# Patient Record
Sex: FEMALE | Race: WHITE | Employment: PART TIME | URBAN - METROPOLITAN AREA
[De-identification: names, ages, dates, MRNs, and addresses within clinical notes are randomized per-mention and may not be internally consistent; named-entity substitution may affect disease eponyms.]

---

## 2017-01-13 ENCOUNTER — LAB ENCOUNTER (OUTPATIENT)
Dept: LAB | Age: 47
End: 2017-01-13
Attending: PHYSICIAN ASSISTANT
Payer: COMMERCIAL

## 2017-01-13 ENCOUNTER — HOSPITAL ENCOUNTER (OUTPATIENT)
Dept: MAMMOGRAPHY | Age: 47
Discharge: HOME | End: 2017-01-13
Attending: PHYSICIAN ASSISTANT
Payer: COMMERCIAL

## 2017-01-13 DIAGNOSIS — Z00.00 LABORATORY EXAM ORDERED AS PART OF ROUTINE GENERAL MEDICAL EXAMINATION: ICD-10-CM

## 2017-01-13 DIAGNOSIS — Z12.31 VISIT FOR SCREENING MAMMOGRAM: ICD-10-CM

## 2017-01-13 LAB
25-HYDROXYVITAMIN D (TOTAL): 24.3 NG/ML (ref 30–100)
ALBUMIN SERPL-MCNC: 4.3 G/DL (ref 3.5–4.8)
ALP LIVER SERPL-CCNC: 95 U/L (ref 39–100)
ALT SERPL-CCNC: 18 U/L (ref 14–54)
AST SERPL-CCNC: 10 U/L (ref 15–41)
BASOPHILS # BLD AUTO: 0.01 X10(3) UL (ref 0–0.1)
BASOPHILS NFR BLD AUTO: 0.1 %
BILIRUB SERPL-MCNC: 0.6 MG/DL (ref 0.1–2)
BUN BLD-MCNC: 12 MG/DL (ref 8–20)
CALCIUM BLD-MCNC: 9.2 MG/DL (ref 8.3–10.3)
CHLORIDE: 105 MMOL/L (ref 101–111)
CHOLEST SMN-MCNC: 180 MG/DL (ref ?–200)
CO2: 28 MMOL/L (ref 22–32)
CREAT BLD-MCNC: 0.87 MG/DL (ref 0.55–1.02)
EOSINOPHIL # BLD AUTO: 0.17 X10(3) UL (ref 0–0.3)
EOSINOPHIL NFR BLD AUTO: 2.3 %
ERYTHROCYTE [DISTWIDTH] IN BLOOD BY AUTOMATED COUNT: 13 % (ref 11.5–16)
GLUCOSE BLD-MCNC: 85 MG/DL (ref 70–99)
HCT VFR BLD AUTO: 42.8 % (ref 34–50)
HDLC SERPL-MCNC: 47 MG/DL (ref 45–?)
HDLC SERPL: 3.83 {RATIO} (ref ?–4.44)
HGB BLD-MCNC: 13.8 G/DL (ref 12–16)
IMMATURE GRANULOCYTE COUNT: 0.03 X10(3) UL (ref 0–1)
IMMATURE GRANULOCYTE RATIO %: 0.4 %
LDLC SERPL CALC-MCNC: 116 MG/DL (ref ?–130)
LYMPHOCYTES # BLD AUTO: 1.55 X10(3) UL (ref 0.9–4)
LYMPHOCYTES NFR BLD AUTO: 20.7 %
M PROTEIN MFR SERPL ELPH: 7.7 G/DL (ref 6.1–8.3)
MCH RBC QN AUTO: 28.2 PG (ref 27–33.2)
MCHC RBC AUTO-ENTMCNC: 32.2 G/DL (ref 31–37)
MCV RBC AUTO: 87.3 FL (ref 81–100)
MONOCYTES # BLD AUTO: 0.49 X10(3) UL (ref 0.1–0.6)
MONOCYTES NFR BLD AUTO: 6.6 %
NEUTROPHIL ABS PRELIM: 5.23 X10 (3) UL (ref 1.3–6.7)
NEUTROPHILS # BLD AUTO: 5.23 X10(3) UL (ref 1.3–6.7)
NEUTROPHILS NFR BLD AUTO: 69.9 %
NONHDLC SERPL-MCNC: 133 MG/DL (ref ?–130)
PLATELET # BLD AUTO: 286 10(3)UL (ref 150–450)
POTASSIUM SERPL-SCNC: 4.6 MMOL/L (ref 3.6–5.1)
RBC # BLD AUTO: 4.9 X10(6)UL (ref 3.8–5.1)
RED CELL DISTRIBUTION WIDTH-SD: 40.7 FL (ref 35.1–46.3)
SODIUM SERPL-SCNC: 141 MMOL/L (ref 136–144)
TRIGLYCERIDES: 84 MG/DL (ref ?–150)
TSI SER-ACNC: 1.32 MIU/ML (ref 0.35–5.5)
VLDL: 17 MG/DL (ref 5–40)
WBC # BLD AUTO: 7.5 X10(3) UL (ref 4–13)

## 2017-01-13 PROCEDURE — 84443 ASSAY THYROID STIM HORMONE: CPT

## 2017-01-13 PROCEDURE — 77067 SCR MAMMO BI INCL CAD: CPT

## 2017-01-13 PROCEDURE — 36415 COLL VENOUS BLD VENIPUNCTURE: CPT

## 2017-01-13 PROCEDURE — 85025 COMPLETE CBC W/AUTO DIFF WBC: CPT

## 2017-01-13 PROCEDURE — 80053 COMPREHEN METABOLIC PANEL: CPT

## 2017-01-13 PROCEDURE — 77063 BREAST TOMOSYNTHESIS BI: CPT

## 2017-01-13 PROCEDURE — 82306 VITAMIN D 25 HYDROXY: CPT

## 2017-01-13 PROCEDURE — 80061 LIPID PANEL: CPT

## 2017-08-29 ENCOUNTER — OFFICE VISIT (OUTPATIENT)
Dept: FAMILY MEDICINE CLINIC | Facility: CLINIC | Age: 47
End: 2017-08-29

## 2017-08-29 VITALS
OXYGEN SATURATION: 97 % | WEIGHT: 161 LBS | SYSTOLIC BLOOD PRESSURE: 114 MMHG | HEIGHT: 64 IN | HEART RATE: 75 BPM | DIASTOLIC BLOOD PRESSURE: 80 MMHG | BODY MASS INDEX: 27.49 KG/M2 | RESPIRATION RATE: 20 BRPM | TEMPERATURE: 98 F

## 2017-08-29 DIAGNOSIS — M54.16 CHRONIC RADICULAR LUMBAR PAIN: Primary | ICD-10-CM

## 2017-08-29 DIAGNOSIS — G89.29 CHRONIC RADICULAR LUMBAR PAIN: Primary | ICD-10-CM

## 2017-08-29 PROCEDURE — 99213 OFFICE O/P EST LOW 20 MIN: CPT | Performed by: INTERNAL MEDICINE

## 2017-08-29 RX ORDER — COVID-19 ANTIGEN TEST
220 KIT MISCELLANEOUS
COMMUNITY
End: 2018-10-19 | Stop reason: ALTCHOICE

## 2017-08-29 NOTE — PROGRESS NOTES
HPI:   Sharon Lou is a 52year old female here for complaints of back pain. Pain is located at left low back, low back. Pain is described as dull, aching, sometimes sharp. Severity is moderate, constant.  The pain radiates to occasionally ankle with numbne

## 2017-08-31 ENCOUNTER — TELEPHONE (OUTPATIENT)
Dept: FAMILY MEDICINE CLINIC | Facility: CLINIC | Age: 47
End: 2017-08-31

## 2018-10-18 NOTE — PROGRESS NOTES
HPI:   Lakeshia Elliott is a 50year old female who presents for a complete physical exam. Symptoms: denies discharge, itching, burning or dysuria.  Patient complains of needing physical.     Wt Readings from Last 6 Encounters:  10/19/18 : 163 lb  08/29/17 : 1 History:   Procedure Laterality Date   • JUSTINA BIOPSY STEREOTACTIC NODULE 2 SITE BILAT  5/2011  2 sites     BENIGN      Family History   Problem Relation Age of Onset   • Heart Attack Father    • Other (grave's disease[other]) Mother    • Breast Cancer Mater axillary LAD, no masses no nipple discharge bilaterally  : external genitalia - no inguinal LAD, no lesions. Speculum exam- introitus is normal,scant discharge,cervix is pink.  Bimanual exam- no adnexal masses or tenderness, PAP was done   [facility] understanding of these issues and agrees to the plan. Follow up in 1 month for BP check.

## 2018-10-19 ENCOUNTER — OFFICE VISIT (OUTPATIENT)
Dept: FAMILY MEDICINE CLINIC | Facility: CLINIC | Age: 48
End: 2018-10-19
Payer: COMMERCIAL

## 2018-10-19 VITALS
SYSTOLIC BLOOD PRESSURE: 130 MMHG | DIASTOLIC BLOOD PRESSURE: 82 MMHG | HEART RATE: 88 BPM | HEIGHT: 63.5 IN | BODY MASS INDEX: 28.53 KG/M2 | WEIGHT: 163 LBS | TEMPERATURE: 99 F | RESPIRATION RATE: 16 BRPM | OXYGEN SATURATION: 98 %

## 2018-10-19 DIAGNOSIS — M79.89 SWELLING OF LOWER LEG: ICD-10-CM

## 2018-10-19 DIAGNOSIS — Z00.00 LABORATORY EXAM ORDERED AS PART OF ROUTINE GENERAL MEDICAL EXAMINATION: ICD-10-CM

## 2018-10-19 DIAGNOSIS — Z83.2 FAMILY HISTORY OF CLOTTING DISORDER: ICD-10-CM

## 2018-10-19 DIAGNOSIS — Z01.419 WELL WOMAN EXAM WITH ROUTINE GYNECOLOGICAL EXAM: Primary | ICD-10-CM

## 2018-10-19 DIAGNOSIS — Z98.890 HISTORY OF FEMALE STERILIZATION: ICD-10-CM

## 2018-10-19 DIAGNOSIS — Z12.31 VISIT FOR SCREENING MAMMOGRAM: ICD-10-CM

## 2018-10-19 DIAGNOSIS — Z12.4 SCREENING FOR MALIGNANT NEOPLASM OF CERVIX: ICD-10-CM

## 2018-10-19 DIAGNOSIS — R03.0 ELEVATED BLOOD PRESSURE READING: ICD-10-CM

## 2018-10-19 PROCEDURE — 88175 CYTOPATH C/V AUTO FLUID REDO: CPT | Performed by: PHYSICIAN ASSISTANT

## 2018-10-19 PROCEDURE — 87624 HPV HI-RISK TYP POOLED RSLT: CPT | Performed by: PHYSICIAN ASSISTANT

## 2018-10-19 PROCEDURE — 99396 PREV VISIT EST AGE 40-64: CPT | Performed by: PHYSICIAN ASSISTANT

## 2018-10-19 PROCEDURE — 99214 OFFICE O/P EST MOD 30 MIN: CPT | Performed by: PHYSICIAN ASSISTANT

## 2018-10-22 ENCOUNTER — LAB ENCOUNTER (OUTPATIENT)
Dept: LAB | Age: 48
End: 2018-10-22
Attending: PHYSICIAN ASSISTANT
Payer: COMMERCIAL

## 2018-10-22 DIAGNOSIS — Z83.2: ICD-10-CM

## 2018-10-22 DIAGNOSIS — M79.89 SWELLING OF LIMB: Primary | ICD-10-CM

## 2018-10-22 PROCEDURE — 83880 ASSAY OF NATRIURETIC PEPTIDE: CPT

## 2018-10-22 PROCEDURE — 81241 F5 GENE: CPT

## 2018-10-22 PROCEDURE — 80053 COMPREHEN METABOLIC PANEL: CPT

## 2018-10-22 PROCEDURE — 82306 VITAMIN D 25 HYDROXY: CPT

## 2018-10-22 PROCEDURE — 80061 LIPID PANEL: CPT

## 2018-10-22 PROCEDURE — 85025 COMPLETE CBC W/AUTO DIFF WBC: CPT

## 2018-10-22 PROCEDURE — 84443 ASSAY THYROID STIM HORMONE: CPT

## 2018-10-22 PROCEDURE — 82607 VITAMIN B-12: CPT

## 2018-10-24 ENCOUNTER — TELEPHONE (OUTPATIENT)
Dept: FAMILY MEDICINE CLINIC | Facility: CLINIC | Age: 48
End: 2018-10-24

## 2018-10-31 ENCOUNTER — HOSPITAL ENCOUNTER (OUTPATIENT)
Dept: MAMMOGRAPHY | Age: 48
Discharge: HOME | End: 2018-10-31
Attending: PHYSICIAN ASSISTANT
Payer: COMMERCIAL

## 2018-10-31 DIAGNOSIS — Z12.31 VISIT FOR SCREENING MAMMOGRAM: ICD-10-CM

## 2018-10-31 PROCEDURE — 77067 SCR MAMMO BI INCL CAD: CPT | Performed by: PHYSICIAN ASSISTANT

## 2018-10-31 PROCEDURE — 77063 BREAST TOMOSYNTHESIS BI: CPT | Performed by: PHYSICIAN ASSISTANT

## 2018-11-05 ENCOUNTER — HOSPITAL ENCOUNTER (OUTPATIENT)
Dept: ULTRASOUND IMAGING | Facility: HOSPITAL | Age: 48
Discharge: HOME | End: 2018-11-05
Attending: PHYSICIAN ASSISTANT
Payer: COMMERCIAL

## 2018-11-05 DIAGNOSIS — Z98.890 HISTORY OF FEMALE STERILIZATION: ICD-10-CM

## 2018-11-05 PROCEDURE — 76830 TRANSVAGINAL US NON-OB: CPT | Performed by: PHYSICIAN ASSISTANT

## 2018-11-05 PROCEDURE — 76856 US EXAM PELVIC COMPLETE: CPT | Performed by: PHYSICIAN ASSISTANT

## 2019-05-28 ENCOUNTER — PATIENT MESSAGE (OUTPATIENT)
Dept: FAMILY MEDICINE CLINIC | Facility: CLINIC | Age: 49
End: 2019-05-28

## 2019-05-29 RX ORDER — SCOLOPAMINE TRANSDERMAL SYSTEM 1 MG/1
1 PATCH, EXTENDED RELEASE TRANSDERMAL
Qty: 4 PATCH | Refills: 0 | Status: SHIPPED | OUTPATIENT
Start: 2019-05-29 | End: 2019-09-23 | Stop reason: ALTCHOICE

## 2019-05-29 NOTE — TELEPHONE ENCOUNTER
From: Ethel Mansfield  To: Yannick Magaña MD  Sent: 5/28/2019 7:16 PM CDT  Subject: Other    We are traveling to [facility] at the end of June and are planning on taking a day trip in a boat to [facility].  With Zackary's vestibular issues and my tenancy to be sick in

## 2019-06-18 ENCOUNTER — APPOINTMENT (OUTPATIENT)
Dept: LAB | Age: 49
End: 2019-06-18
Attending: INTERNAL MEDICINE
Payer: COMMERCIAL

## 2019-06-18 ENCOUNTER — OFFICE VISIT (OUTPATIENT)
Dept: FAMILY MEDICINE CLINIC | Facility: CLINIC | Age: 49
End: 2019-06-18
Payer: COMMERCIAL

## 2019-06-18 VITALS
HEART RATE: 73 BPM | BODY MASS INDEX: 29.22 KG/M2 | WEIGHT: 167 LBS | SYSTOLIC BLOOD PRESSURE: 124 MMHG | HEIGHT: 63.4 IN | OXYGEN SATURATION: 99 % | TEMPERATURE: 98 F | RESPIRATION RATE: 16 BRPM | DIASTOLIC BLOOD PRESSURE: 72 MMHG

## 2019-06-18 DIAGNOSIS — N91.2 AMENORRHEA: ICD-10-CM

## 2019-06-18 DIAGNOSIS — R23.2 HOT FLASHES: Primary | ICD-10-CM

## 2019-06-18 PROCEDURE — 84443 ASSAY THYROID STIM HORMONE: CPT | Performed by: INTERNAL MEDICINE

## 2019-06-18 PROCEDURE — 82670 ASSAY OF TOTAL ESTRADIOL: CPT | Performed by: INTERNAL MEDICINE

## 2019-06-18 PROCEDURE — 84146 ASSAY OF PROLACTIN: CPT | Performed by: INTERNAL MEDICINE

## 2019-06-18 PROCEDURE — 36415 COLL VENOUS BLD VENIPUNCTURE: CPT | Performed by: INTERNAL MEDICINE

## 2019-06-18 PROCEDURE — 99214 OFFICE O/P EST MOD 30 MIN: CPT | Performed by: INTERNAL MEDICINE

## 2019-06-18 RX ORDER — VENLAFAXINE HYDROCHLORIDE 37.5 MG/1
37.5 CAPSULE, EXTENDED RELEASE ORAL DAILY
Qty: 30 CAPSULE | Refills: 1 | Status: SHIPPED | OUTPATIENT
Start: 2019-06-18 | End: 2019-08-07

## 2019-06-18 NOTE — PROGRESS NOTES
Scout Butterfield is a 52year old female. HPI:   Here with menses that ended beginning of April. No may or June menses.   Also with + hot flashes, severe, about 2 an hour.  + night sweats  Some up & down mood but tolerable  Doesn't know FH regarding menopause ok to communicate thru MyChart regarding lab results and Rx. Orders Placed This Encounter      TSH [E]      Estradiol [E]      Prolactin [E]    None  No orders of the defined types were placed in this encounter.       The patient indicates understanding

## 2019-08-09 RX ORDER — VENLAFAXINE HYDROCHLORIDE 37.5 MG/1
37.5 CAPSULE, EXTENDED RELEASE ORAL DAILY
Qty: 30 CAPSULE | Refills: 1 | Status: SHIPPED | OUTPATIENT
Start: 2019-08-09 | End: 2019-09-23

## 2019-09-23 ENCOUNTER — OFFICE VISIT (OUTPATIENT)
Dept: FAMILY MEDICINE CLINIC | Facility: CLINIC | Age: 49
End: 2019-09-23
Payer: COMMERCIAL

## 2019-09-23 VITALS
RESPIRATION RATE: 20 BRPM | DIASTOLIC BLOOD PRESSURE: 76 MMHG | WEIGHT: 171 LBS | SYSTOLIC BLOOD PRESSURE: 122 MMHG | TEMPERATURE: 99 F | HEIGHT: 63.4 IN | HEART RATE: 87 BPM | BODY MASS INDEX: 29.92 KG/M2 | OXYGEN SATURATION: 98 %

## 2019-09-23 DIAGNOSIS — N95.1 HOT FLASHES, MENOPAUSAL: Primary | ICD-10-CM

## 2019-09-23 DIAGNOSIS — Z12.31 ENCOUNTER FOR SCREENING MAMMOGRAM FOR MALIGNANT NEOPLASM OF BREAST: ICD-10-CM

## 2019-09-23 PROCEDURE — 99213 OFFICE O/P EST LOW 20 MIN: CPT | Performed by: INTERNAL MEDICINE

## 2019-09-23 RX ORDER — VENLAFAXINE HYDROCHLORIDE 37.5 MG/1
37.5 CAPSULE, EXTENDED RELEASE ORAL DAILY
Qty: 90 CAPSULE | Refills: 3 | Status: SHIPPED | OUTPATIENT
Start: 2019-09-23 | End: 2020-12-21

## 2019-09-24 NOTE — PROGRESS NOTES
Lisa Sequeira is a 52year old female. HPI:   Here for follow up hot flashes that started when she stopped menses in April.  effexor xr started with relief of night sweats and flashes.   Pt has since had another menses in August.  Doing well on med, no SE encounter. [facility] HEMANT 2D+3D SCREENING BILAT (CPT=77067/33668)  Orders Placed This Encounter      Venlafaxine HCl ER (EFFEXOR XR) 37.5 MG Oral Capsule SR 24 Hr      The patient indicates understanding of these issues and agrees to the plan.   Follow up 1 ye

## 2019-10-15 ENCOUNTER — OFFICE VISIT (OUTPATIENT)
Dept: FAMILY MEDICINE CLINIC | Facility: CLINIC | Age: 49
End: 2019-10-15
Payer: COMMERCIAL

## 2019-10-15 VITALS
RESPIRATION RATE: 20 BRPM | HEART RATE: 74 BPM | TEMPERATURE: 99 F | HEIGHT: 64 IN | BODY MASS INDEX: 28.68 KG/M2 | DIASTOLIC BLOOD PRESSURE: 74 MMHG | SYSTOLIC BLOOD PRESSURE: 110 MMHG | WEIGHT: 168 LBS | OXYGEN SATURATION: 98 %

## 2019-10-15 DIAGNOSIS — J06.9 VIRAL UPPER RESPIRATORY TRACT INFECTION: Primary | ICD-10-CM

## 2019-10-15 DIAGNOSIS — J02.9 SORE THROAT: ICD-10-CM

## 2019-10-15 PROCEDURE — 99213 OFFICE O/P EST LOW 20 MIN: CPT | Performed by: INTERNAL MEDICINE

## 2019-10-15 PROCEDURE — 87880 STREP A ASSAY W/OPTIC: CPT | Performed by: INTERNAL MEDICINE

## 2019-10-16 NOTE — PROGRESS NOTES
HPI:   Yaquelin Bellamy is a 52year old female who presents with sore throat, congestion, dry cough for  4  days. Venlafaxine HCl ER (EFFEXOR XR) 37.5 MG Oral Capsule SR 24 Hr, Take 1 capsule (37.5 mg total) by mouth daily. , Disp: 90 capsule, Rfl: 3

## 2019-11-01 ENCOUNTER — OFFICE VISIT (OUTPATIENT)
Dept: FAMILY MEDICINE CLINIC | Facility: CLINIC | Age: 49
End: 2019-11-01
Payer: COMMERCIAL

## 2019-11-01 VITALS
HEIGHT: 64 IN | BODY MASS INDEX: 29.26 KG/M2 | RESPIRATION RATE: 16 BRPM | DIASTOLIC BLOOD PRESSURE: 82 MMHG | TEMPERATURE: 98 F | SYSTOLIC BLOOD PRESSURE: 122 MMHG | HEART RATE: 79 BPM | WEIGHT: 171.38 LBS

## 2019-11-01 DIAGNOSIS — R30.0 DYSURIA: Primary | ICD-10-CM

## 2019-11-01 PROCEDURE — 87086 URINE CULTURE/COLONY COUNT: CPT | Performed by: NURSE PRACTITIONER

## 2019-11-01 PROCEDURE — 87088 URINE BACTERIA CULTURE: CPT | Performed by: NURSE PRACTITIONER

## 2019-11-01 PROCEDURE — 87186 SC STD MICRODIL/AGAR DIL: CPT | Performed by: NURSE PRACTITIONER

## 2019-11-01 PROCEDURE — 99213 OFFICE O/P EST LOW 20 MIN: CPT | Performed by: NURSE PRACTITIONER

## 2019-11-01 PROCEDURE — 81003 URINALYSIS AUTO W/O SCOPE: CPT | Performed by: NURSE PRACTITIONER

## 2019-11-01 RX ORDER — NITROFURANTOIN 25; 75 MG/1; MG/1
100 CAPSULE ORAL 2 TIMES DAILY
Qty: 10 CAPSULE | Refills: 0 | COMMUNITY
Start: 2019-11-01 | End: 2020-12-21

## 2019-11-01 NOTE — PROGRESS NOTES
Juan Jose Christianson is a 52year old female. HPI:   Patient presents with urinary symptoms for about 1 week. Complaining of urinary frequency, urgency, dysuria, suprapubic pain. Denies flank pain, back pain, fever or hematuria.    Sexual activity: with spouse  V Negative mg/dL    Spec Gravity 1.005 1.005 - 1.030    Blood Urine trace-intact Negative    PH Urine 5.0 4.5 - 8.0    Protein Urine negative Negative/Trace mg/dL    Urobilinogen Urine 0.2 0.0 - 1.9 mg/dL    Nitrite Urine negative Negative    Leukocyte Mary Kate

## 2019-12-07 ENCOUNTER — OFFICE VISIT (OUTPATIENT)
Dept: FAMILY MEDICINE CLINIC | Facility: CLINIC | Age: 49
End: 2019-12-07
Payer: COMMERCIAL

## 2019-12-07 VITALS
SYSTOLIC BLOOD PRESSURE: 120 MMHG | BODY MASS INDEX: 33.12 KG/M2 | DIASTOLIC BLOOD PRESSURE: 80 MMHG | WEIGHT: 194 LBS | HEIGHT: 64 IN | RESPIRATION RATE: 16 BRPM | HEART RATE: 79 BPM | TEMPERATURE: 99 F | OXYGEN SATURATION: 98 %

## 2019-12-07 DIAGNOSIS — N30.01 ACUTE CYSTITIS WITH HEMATURIA: Primary | ICD-10-CM

## 2019-12-07 DIAGNOSIS — R30.0 DYSURIA: ICD-10-CM

## 2019-12-07 PROCEDURE — 87077 CULTURE AEROBIC IDENTIFY: CPT | Performed by: NURSE PRACTITIONER

## 2019-12-07 PROCEDURE — 81003 URINALYSIS AUTO W/O SCOPE: CPT | Performed by: NURSE PRACTITIONER

## 2019-12-07 PROCEDURE — 87186 SC STD MICRODIL/AGAR DIL: CPT | Performed by: NURSE PRACTITIONER

## 2019-12-07 PROCEDURE — 99213 OFFICE O/P EST LOW 20 MIN: CPT | Performed by: NURSE PRACTITIONER

## 2019-12-07 PROCEDURE — 87086 URINE CULTURE/COLONY COUNT: CPT | Performed by: NURSE PRACTITIONER

## 2019-12-07 RX ORDER — SULFAMETHOXAZOLE AND TRIMETHOPRIM 800; 160 MG/1; MG/1
1 TABLET ORAL 2 TIMES DAILY
Qty: 6 TABLET | Refills: 0 | Status: SHIPPED | OUTPATIENT
Start: 2019-12-07 | End: 2019-12-10

## 2019-12-07 NOTE — PROGRESS NOTES
Phu Clark is a 52year old female. HPI:   Patient presents with symptoms of UTI for 1 days. Complaining of urinary frequency, urgency, dysuria, pos suprapubic pressure,   Denies back pain, fever, hematuria.   Pt has history of UTI in past.    Radha Field Educated on medication  Educated on supportive measures  Educated on s/s of worsening sx and when to seek higher level of care  Follow up with pcp if not improving  Patient Instructions     Urinary Tract Infections in Women    Urinary tract infections (UTI Most UTIs are treated with antibiotics. These kill the bacteria. The length of time you need to take them depends on the type of infection. It may be as short as 3 days. If you have repeated UTIs, you may need a low-dose antibiotic for several months.  Take © 1289-4332 [employer] [address]. [address] [facility], [address] [place]. All rights reserved. This information is not intended as a substitute for professional medical care. Always follow your healthcare professional's instructions.           The p

## 2019-12-19 ENCOUNTER — HOSPITAL ENCOUNTER (OUTPATIENT)
Dept: MAMMOGRAPHY | Age: 49
Discharge: HOME | End: 2019-12-19
Attending: INTERNAL MEDICINE
Payer: COMMERCIAL

## 2019-12-19 DIAGNOSIS — Z12.31 ENCOUNTER FOR SCREENING MAMMOGRAM FOR MALIGNANT NEOPLASM OF BREAST: ICD-10-CM

## 2019-12-19 PROCEDURE — 77063 BREAST TOMOSYNTHESIS BI: CPT | Performed by: INTERNAL MEDICINE

## 2019-12-19 PROCEDURE — 77067 SCR MAMMO BI INCL CAD: CPT | Performed by: INTERNAL MEDICINE

## 2019-12-27 ENCOUNTER — HOSPITAL ENCOUNTER (OUTPATIENT)
Dept: ULTRASOUND IMAGING | Age: 49
Discharge: HOME | End: 2019-12-27
Attending: INTERNAL MEDICINE
Payer: COMMERCIAL

## 2019-12-27 ENCOUNTER — HOSPITAL ENCOUNTER (OUTPATIENT)
Dept: MAMMOGRAPHY | Age: 49
Discharge: HOME | End: 2019-12-27
Attending: INTERNAL MEDICINE
Payer: COMMERCIAL

## 2019-12-27 DIAGNOSIS — R92.2 INCONCLUSIVE MAMMOGRAM: ICD-10-CM

## 2019-12-27 PROCEDURE — 76642 ULTRASOUND BREAST LIMITED: CPT | Performed by: INTERNAL MEDICINE

## 2019-12-27 PROCEDURE — 77065 DX MAMMO INCL CAD UNI: CPT | Performed by: INTERNAL MEDICINE

## 2019-12-27 PROCEDURE — 77061 BREAST TOMOSYNTHESIS UNI: CPT | Performed by: INTERNAL MEDICINE

## 2020-03-12 ENCOUNTER — PATIENT MESSAGE (OUTPATIENT)
Dept: FAMILY MEDICINE CLINIC | Facility: CLINIC | Age: 50
End: 2020-03-12

## 2020-03-12 NOTE — TELEPHONE ENCOUNTER
From: Ethel Mansfield  To: Aneta Correa NP  Sent: 3/12/2020 2:13 PM CDT  Subject: Prescription Question    Shara Angel - Back in June you had prescribed effexor 37.5mg daily for hot flashes, it has worked great until recently I seem to have started get

## 2020-03-14 RX ORDER — VENLAFAXINE HYDROCHLORIDE 75 MG/1
75 CAPSULE, EXTENDED RELEASE ORAL DAILY
Qty: 90 CAPSULE | Refills: 1 | Status: SHIPPED | OUTPATIENT
Start: 2020-03-14 | End: 2020-09-08

## 2020-09-08 RX ORDER — VENLAFAXINE HYDROCHLORIDE 75 MG/1
CAPSULE, EXTENDED RELEASE ORAL
Qty: 90 CAPSULE | Refills: 0 | Status: SHIPPED | OUTPATIENT
Start: 2020-09-08 | End: 2020-12-17

## 2020-11-28 RX ORDER — VENLAFAXINE HYDROCHLORIDE 75 MG/1
CAPSULE, EXTENDED RELEASE ORAL
Qty: 90 CAPSULE | Refills: 0 | OUTPATIENT
Start: 2020-11-28

## 2020-12-03 ENCOUNTER — PATIENT MESSAGE (OUTPATIENT)
Dept: FAMILY MEDICINE CLINIC | Facility: CLINIC | Age: 50
End: 2020-12-03

## 2020-12-03 NOTE — TELEPHONE ENCOUNTER
From: Tatiana John  To: Susan Rebolledo NP  Sent: 12/3/2020 1:21 PM CST  Subject: Prescription Question    Hi,     I scheduled my annual check up, but could not get in until 12/21.  Can you refill my prescription for 30 days so I can have the medicin

## 2020-12-17 RX ORDER — VENLAFAXINE HYDROCHLORIDE 75 MG/1
75 CAPSULE, EXTENDED RELEASE ORAL DAILY
Qty: 90 CAPSULE | Refills: 0 | Status: SHIPPED | OUTPATIENT
Start: 2020-12-17 | End: 2021-03-05

## 2020-12-17 NOTE — TELEPHONE ENCOUNTER
Refill request:  9/8/2020 last 90 day refill of Venlafaxine HCL ER 75mg.   Last OV: 10/15/2019  Future OV 12/21/2020  Approve/deny:

## 2020-12-21 ENCOUNTER — OFFICE VISIT (OUTPATIENT)
Dept: FAMILY MEDICINE CLINIC | Facility: CLINIC | Age: 50
End: 2020-12-21
Payer: COMMERCIAL

## 2020-12-21 VITALS
HEIGHT: 64 IN | BODY MASS INDEX: 29.53 KG/M2 | HEART RATE: 71 BPM | DIASTOLIC BLOOD PRESSURE: 88 MMHG | TEMPERATURE: 98 F | SYSTOLIC BLOOD PRESSURE: 144 MMHG | WEIGHT: 173 LBS | RESPIRATION RATE: 20 BRPM | OXYGEN SATURATION: 98 %

## 2020-12-21 DIAGNOSIS — Z12.31 ENCOUNTER FOR SCREENING MAMMOGRAM FOR BREAST CANCER: ICD-10-CM

## 2020-12-21 DIAGNOSIS — Z00.00 ROUTINE GENERAL MEDICAL EXAMINATION AT A HEALTH CARE FACILITY: Primary | ICD-10-CM

## 2020-12-21 PROCEDURE — 99396 PREV VISIT EST AGE 40-64: CPT | Performed by: INTERNAL MEDICINE

## 2020-12-21 PROCEDURE — 3079F DIAST BP 80-89 MM HG: CPT | Performed by: INTERNAL MEDICINE

## 2020-12-21 PROCEDURE — 3077F SYST BP >= 140 MM HG: CPT | Performed by: INTERNAL MEDICINE

## 2020-12-21 PROCEDURE — 3008F BODY MASS INDEX DOCD: CPT | Performed by: INTERNAL MEDICINE

## 2020-12-21 NOTE — PROGRESS NOTES
HPI:   Lindsey Bishop is a 48year old female who presents for a well woman exam. Symptoms: denies discharge, itching, burning or dysuria, perimenopausal.  Went 4 months without a menses, then had one this month that was much lighter than usual.    Patient' MS: denies back pain  NEURO: denies headaches or dizziness  PSYCH: denies depression or anxiety  HEME: denies hx of anemia  ENDOCRINE: denies thyroid history, denies excessive thirst, denies significant weight change; + hot flashes/night sweats controlle Lipid Panel [E]      TSH W REFLEX TO FREE T4      .

## 2021-01-14 ENCOUNTER — LAB ENCOUNTER (OUTPATIENT)
Dept: LAB | Age: 51
End: 2021-01-14
Attending: INTERNAL MEDICINE
Payer: COMMERCIAL

## 2021-01-14 DIAGNOSIS — Z00.00 ROUTINE GENERAL MEDICAL EXAMINATION AT A HEALTH CARE FACILITY: ICD-10-CM

## 2021-01-14 LAB
ALBUMIN SERPL-MCNC: 3.6 G/DL (ref 3.4–5)
ALBUMIN/GLOB SERPL: 1 {RATIO} (ref 1–2)
ALP LIVER SERPL-CCNC: 111 U/L
ALT SERPL-CCNC: 22 U/L
ANION GAP SERPL CALC-SCNC: 5 MMOL/L (ref 0–18)
AST SERPL-CCNC: 11 U/L (ref 15–37)
BASOPHILS # BLD AUTO: 0.01 X10(3) UL (ref 0–0.2)
BASOPHILS NFR BLD AUTO: 0.1 %
BILIRUB SERPL-MCNC: 0.3 MG/DL (ref 0.1–2)
BUN BLD-MCNC: 12 MG/DL (ref 7–18)
BUN/CREAT SERPL: 12.8 (ref 10–20)
CALCIUM BLD-MCNC: 8.8 MG/DL (ref 8.5–10.1)
CHLORIDE SERPL-SCNC: 107 MMOL/L (ref 98–112)
CHOLEST SMN-MCNC: 199 MG/DL (ref ?–200)
CO2 SERPL-SCNC: 26 MMOL/L (ref 21–32)
CREAT BLD-MCNC: 0.94 MG/DL
DEPRECATED RDW RBC AUTO: 49.5 FL (ref 35.1–46.3)
EOSINOPHIL # BLD AUTO: 0 X10(3) UL (ref 0–0.7)
EOSINOPHIL NFR BLD AUTO: 0 %
ERYTHROCYTE [DISTWIDTH] IN BLOOD BY AUTOMATED COUNT: 16.5 % (ref 11–15)
GLOBULIN PLAS-MCNC: 3.5 G/DL (ref 2.8–4.4)
GLUCOSE BLD-MCNC: 106 MG/DL (ref 70–99)
HCT VFR BLD AUTO: 39.9 %
HDLC SERPL-MCNC: 49 MG/DL (ref 40–59)
HGB BLD-MCNC: 12 G/DL
IMM GRANULOCYTES # BLD AUTO: 0.02 X10(3) UL (ref 0–1)
IMM GRANULOCYTES NFR BLD: 0.3 %
LDLC SERPL CALC-MCNC: 127 MG/DL (ref ?–100)
LYMPHOCYTES # BLD AUTO: 1.61 X10(3) UL (ref 1–4)
LYMPHOCYTES NFR BLD AUTO: 22.8 %
M PROTEIN MFR SERPL ELPH: 7.1 G/DL (ref 6.4–8.2)
MCH RBC QN AUTO: 25.1 PG (ref 26–34)
MCHC RBC AUTO-ENTMCNC: 30.1 G/DL (ref 31–37)
MCV RBC AUTO: 83.3 FL
MONOCYTES # BLD AUTO: 0.52 X10(3) UL (ref 0.1–1)
MONOCYTES NFR BLD AUTO: 7.4 %
NEUTROPHILS # BLD AUTO: 4.89 X10 (3) UL (ref 1.5–7.7)
NEUTROPHILS # BLD AUTO: 4.89 X10(3) UL (ref 1.5–7.7)
NEUTROPHILS NFR BLD AUTO: 69.4 %
NONHDLC SERPL-MCNC: 150 MG/DL (ref ?–130)
OSMOLALITY SERPL CALC.SUM OF ELEC: 286 MOSM/KG (ref 275–295)
PATIENT FASTING Y/N/NP: YES
PATIENT FASTING Y/N/NP: YES
PLATELET # BLD AUTO: 277 10(3)UL (ref 150–450)
POTASSIUM SERPL-SCNC: 4.8 MMOL/L (ref 3.5–5.1)
RBC # BLD AUTO: 4.79 X10(6)UL
SODIUM SERPL-SCNC: 138 MMOL/L (ref 136–145)
TRIGL SERPL-MCNC: 113 MG/DL (ref 30–149)
TSI SER-ACNC: 1.49 MIU/ML (ref 0.36–3.74)
VLDLC SERPL CALC-MCNC: 23 MG/DL (ref 0–30)
WBC # BLD AUTO: 7.1 X10(3) UL (ref 4–11)

## 2021-01-14 PROCEDURE — 36415 COLL VENOUS BLD VENIPUNCTURE: CPT

## 2021-01-14 PROCEDURE — 80053 COMPREHEN METABOLIC PANEL: CPT

## 2021-01-14 PROCEDURE — 85025 COMPLETE CBC W/AUTO DIFF WBC: CPT

## 2021-01-14 PROCEDURE — 80061 LIPID PANEL: CPT

## 2021-01-14 PROCEDURE — 84443 ASSAY THYROID STIM HORMONE: CPT

## 2021-01-15 DIAGNOSIS — E78.00 HIGH CHOLESTEROL: ICD-10-CM

## 2021-01-15 DIAGNOSIS — R73.9 HYPERGLYCEMIA: Primary | ICD-10-CM

## 2021-01-27 ENCOUNTER — HOSPITAL ENCOUNTER (OUTPATIENT)
Dept: MAMMOGRAPHY | Age: 51
Discharge: HOME | End: 2021-01-27
Attending: INTERNAL MEDICINE
Payer: COMMERCIAL

## 2021-01-27 DIAGNOSIS — Z12.31 ENCOUNTER FOR SCREENING MAMMOGRAM FOR BREAST CANCER: ICD-10-CM

## 2021-01-27 DIAGNOSIS — Z00.00 ROUTINE GENERAL MEDICAL EXAMINATION AT A HEALTH CARE FACILITY: ICD-10-CM

## 2021-01-27 PROCEDURE — 77063 BREAST TOMOSYNTHESIS BI: CPT | Performed by: INTERNAL MEDICINE

## 2021-01-27 PROCEDURE — 77067 SCR MAMMO BI INCL CAD: CPT | Performed by: INTERNAL MEDICINE

## 2021-02-04 ENCOUNTER — HOSPITAL ENCOUNTER (OUTPATIENT)
Dept: ULTRASOUND IMAGING | Age: 51
Discharge: HOME | End: 2021-02-04
Attending: INTERNAL MEDICINE
Payer: COMMERCIAL

## 2021-02-04 DIAGNOSIS — R92.2 INCONCLUSIVE MAMMOGRAM: ICD-10-CM

## 2021-02-04 PROCEDURE — 76642 ULTRASOUND BREAST LIMITED: CPT | Performed by: INTERNAL MEDICINE

## 2021-03-05 RX ORDER — VENLAFAXINE HYDROCHLORIDE 75 MG/1
CAPSULE, EXTENDED RELEASE ORAL
Qty: 90 CAPSULE | Refills: 0 | Status: SHIPPED | OUTPATIENT
Start: 2021-03-05 | End: 2021-05-24

## 2021-04-13 ENCOUNTER — OFFICE VISIT (OUTPATIENT)
Dept: OBGYN CLINIC | Facility: CLINIC | Age: 51
End: 2021-04-13
Payer: COMMERCIAL

## 2021-04-13 VITALS
DIASTOLIC BLOOD PRESSURE: 70 MMHG | HEIGHT: 64 IN | HEART RATE: 73 BPM | BODY MASS INDEX: 29.53 KG/M2 | SYSTOLIC BLOOD PRESSURE: 126 MMHG | WEIGHT: 173 LBS

## 2021-04-13 DIAGNOSIS — Z87.42 HX OF MENORRHAGIA: ICD-10-CM

## 2021-04-13 DIAGNOSIS — Z01.812 PRE-PROCEDURAL LABORATORY EXAMINATION: ICD-10-CM

## 2021-04-13 DIAGNOSIS — Z12.4 PAPANICOLAOU SMEAR FOR CERVICAL CANCER SCREENING: Primary | ICD-10-CM

## 2021-04-13 DIAGNOSIS — Z30.430 ENCOUNTER FOR INSERTION OF INTRAUTERINE CONTRACEPTIVE DEVICE: ICD-10-CM

## 2021-04-13 PROCEDURE — 58300 INSERT INTRAUTERINE DEVICE: CPT | Performed by: OBSTETRICS & GYNECOLOGY

## 2021-04-13 PROCEDURE — 88175 CYTOPATH C/V AUTO FLUID REDO: CPT | Performed by: OBSTETRICS & GYNECOLOGY

## 2021-04-13 PROCEDURE — 3074F SYST BP LT 130 MM HG: CPT | Performed by: OBSTETRICS & GYNECOLOGY

## 2021-04-13 PROCEDURE — 3078F DIAST BP <80 MM HG: CPT | Performed by: OBSTETRICS & GYNECOLOGY

## 2021-04-13 PROCEDURE — 3008F BODY MASS INDEX DOCD: CPT | Performed by: OBSTETRICS & GYNECOLOGY

## 2021-04-13 PROCEDURE — 81025 URINE PREGNANCY TEST: CPT | Performed by: OBSTETRICS & GYNECOLOGY

## 2021-04-13 NOTE — PROGRESS NOTES
IUD Insertion     Pregnancy Results: negative from urine test   Birth control method(s) used: essureLMP: Bleeds every month for 5 days very heavy has to change a pad and a tampon every hour and a half sometimes gushes through. Consent signed.   Procedure

## 2021-05-19 ENCOUNTER — OFFICE VISIT (OUTPATIENT)
Dept: OBGYN CLINIC | Facility: CLINIC | Age: 51
End: 2021-05-19
Payer: COMMERCIAL

## 2021-05-19 VITALS
SYSTOLIC BLOOD PRESSURE: 128 MMHG | DIASTOLIC BLOOD PRESSURE: 84 MMHG | HEIGHT: 64 IN | WEIGHT: 175.63 LBS | BODY MASS INDEX: 29.98 KG/M2 | HEART RATE: 73 BPM

## 2021-05-19 DIAGNOSIS — Z87.42 HX OF MENORRHAGIA: Primary | ICD-10-CM

## 2021-05-19 RX ORDER — LEVONORGESTREL 52 MG/1
1 INTRAUTERINE DEVICE INTRAUTERINE ONCE
COMMUNITY

## 2021-05-19 RX ORDER — MULTIVIT-MIN/IRON FUM/FOLIC AC 7.5 MG-4
1 TABLET ORAL DAILY
COMMUNITY

## 2021-05-19 NOTE — PROGRESS NOTES
Gyne note       S: patient is a 46year old yo  here for IUD check   Bleeding: regular menses, lighter  Pain: minimal cramping after placement, now none             O:/84 (BP Location: Left arm, Patient Position: Sitting)   Pulse 73   Ht 64\"

## 2021-05-24 RX ORDER — VENLAFAXINE HYDROCHLORIDE 75 MG/1
CAPSULE, EXTENDED RELEASE ORAL
Qty: 90 CAPSULE | Refills: 0 | Status: SHIPPED | OUTPATIENT
Start: 2021-05-24 | End: 2021-08-17

## 2021-05-24 NOTE — TELEPHONE ENCOUNTER
Last refill:  VENLAFAXINE HCL ER 75 MG Oral Capsule SR 24 Hr 90 capsule 0 3/5/2021     Last OV: 12/21/2020 annual  No future appt.   Approve/deny:

## 2021-06-10 ENCOUNTER — PATIENT MESSAGE (OUTPATIENT)
Dept: FAMILY MEDICINE CLINIC | Facility: CLINIC | Age: 51
End: 2021-06-10

## 2021-06-10 ENCOUNTER — MED REC SCAN ONLY (OUTPATIENT)
Dept: FAMILY MEDICINE CLINIC | Facility: CLINIC | Age: 51
End: 2021-06-10

## 2021-06-10 NOTE — TELEPHONE ENCOUNTER
From: Sammi Harris  To: Maxim Penn NP  Sent: 6/10/2021 9:14 AM CDT  Subject: Other    Covid vaccine info for:  Arpita Wei ([birth year])  Andres Green 09/19/02)  Matt Jurado (07/28/05)

## 2021-08-17 RX ORDER — VENLAFAXINE HYDROCHLORIDE 75 MG/1
CAPSULE, EXTENDED RELEASE ORAL
Qty: 90 CAPSULE | Refills: 1 | Status: SHIPPED | OUTPATIENT
Start: 2021-08-17

## 2021-09-05 ENCOUNTER — LAB ENCOUNTER (OUTPATIENT)
Dept: LAB | Facility: HOSPITAL | Age: 51
End: 2021-09-05
Attending: STUDENT IN AN ORGANIZED HEALTH CARE EDUCATION/TRAINING PROGRAM
Payer: COMMERCIAL

## 2021-09-05 DIAGNOSIS — Z01.818 PRE-OP TESTING: ICD-10-CM

## 2021-09-06 LAB — SARS-COV-2 RNA RESP QL NAA+PROBE: NOT DETECTED

## 2021-09-08 PROBLEM — K63.5 COLON POLYPS: Status: ACTIVE | Noted: 2021-09-08

## 2021-09-08 PROBLEM — Z12.11 SPECIAL SCREENING FOR MALIGNANT NEOPLASM OF COLON: Status: ACTIVE | Noted: 2021-09-08

## 2022-02-22 ENCOUNTER — OFFICE VISIT (OUTPATIENT)
Dept: FAMILY MEDICINE CLINIC | Facility: CLINIC | Age: 52
End: 2022-02-22
Payer: COMMERCIAL

## 2022-02-22 VITALS
HEIGHT: 64 IN | RESPIRATION RATE: 18 BRPM | BODY MASS INDEX: 30.73 KG/M2 | OXYGEN SATURATION: 97 % | TEMPERATURE: 97 F | HEART RATE: 77 BPM | DIASTOLIC BLOOD PRESSURE: 86 MMHG | WEIGHT: 180 LBS | SYSTOLIC BLOOD PRESSURE: 136 MMHG

## 2022-02-22 DIAGNOSIS — R03.0 ELEVATED BLOOD PRESSURE READING: Primary | ICD-10-CM

## 2022-02-22 DIAGNOSIS — Z00.00 ROUTINE GENERAL MEDICAL EXAMINATION AT A HEALTH CARE FACILITY: ICD-10-CM

## 2022-02-22 DIAGNOSIS — Z12.31 ENCOUNTER FOR SCREENING MAMMOGRAM FOR BREAST CANCER: ICD-10-CM

## 2022-02-22 DIAGNOSIS — E78.00 HIGH CHOLESTEROL: ICD-10-CM

## 2022-02-22 PROCEDURE — 3079F DIAST BP 80-89 MM HG: CPT | Performed by: INTERNAL MEDICINE

## 2022-02-22 PROCEDURE — 99214 OFFICE O/P EST MOD 30 MIN: CPT | Performed by: INTERNAL MEDICINE

## 2022-02-22 PROCEDURE — 3075F SYST BP GE 130 - 139MM HG: CPT | Performed by: INTERNAL MEDICINE

## 2022-02-22 PROCEDURE — 3008F BODY MASS INDEX DOCD: CPT | Performed by: INTERNAL MEDICINE

## 2022-02-26 LAB
ALBUMIN/GLOBULIN RATIO: 1.7 (CALC) (ref 1–2.5)
ALBUMIN: 4.5 G/DL (ref 3.6–5.1)
ALKALINE PHOSPHATASE: 108 U/L (ref 37–153)
ALT: 20 U/L (ref 6–29)
AST: 18 U/L (ref 10–35)
BILIRUBIN, TOTAL: 0.4 MG/DL (ref 0.2–1.2)
BUN: 17 MG/DL (ref 7–25)
CALCIUM: 9.3 MG/DL (ref 8.6–10.4)
CARBON DIOXIDE: 27 MMOL/L (ref 20–32)
CHLORIDE: 104 MMOL/L (ref 98–110)
CHOL/HDLC RATIO: 5.1 (CALC)
CHOLESTEROL, TOTAL: 215 MG/DL
CREATININE: 0.87 MG/DL (ref 0.5–1.05)
EGFR IF AFRICN AM: 89 ML/MIN/1.73M2
EGFR IF NONAFRICN AM: 77 ML/MIN/1.73M2
GLOBULIN: 2.7 G/DL (CALC) (ref 1.9–3.7)
GLUCOSE: 99 MG/DL (ref 65–99)
HDL CHOLESTEROL: 42 MG/DL
LDL-CHOLESTEROL: 148 MG/DL (CALC)
NON-HDL CHOLESTEROL: 173 MG/DL (CALC)
POTASSIUM: 4.9 MMOL/L (ref 3.5–5.3)
PROTEIN, TOTAL: 7.2 G/DL (ref 6.1–8.1)
SODIUM: 139 MMOL/L (ref 135–146)
TRIGLYCERIDES: 126 MG/DL

## 2022-03-05 RX ORDER — VENLAFAXINE HYDROCHLORIDE 75 MG/1
CAPSULE, EXTENDED RELEASE ORAL
Qty: 90 CAPSULE | Refills: 1 | Status: SHIPPED | OUTPATIENT
Start: 2022-03-05

## 2022-04-20 ENCOUNTER — HOSPITAL ENCOUNTER (OUTPATIENT)
Dept: MAMMOGRAPHY | Age: 52
Discharge: HOME | End: 2022-04-20
Attending: INTERNAL MEDICINE
Payer: COMMERCIAL

## 2022-04-20 DIAGNOSIS — Z12.31 ENCOUNTER FOR SCREENING MAMMOGRAM FOR BREAST CANCER: ICD-10-CM

## 2022-04-20 PROCEDURE — 77067 SCR MAMMO BI INCL CAD: CPT | Performed by: INTERNAL MEDICINE

## 2022-04-20 PROCEDURE — 77063 BREAST TOMOSYNTHESIS BI: CPT | Performed by: INTERNAL MEDICINE

## 2022-08-18 RX ORDER — VENLAFAXINE HYDROCHLORIDE 75 MG/1
CAPSULE, EXTENDED RELEASE ORAL
Qty: 90 CAPSULE | Refills: 1 | Status: SHIPPED | OUTPATIENT
Start: 2022-08-18

## 2023-02-16 RX ORDER — VENLAFAXINE HYDROCHLORIDE 75 MG/1
CAPSULE, EXTENDED RELEASE ORAL
Qty: 90 CAPSULE | Refills: 1 | Status: SHIPPED | OUTPATIENT
Start: 2023-02-16

## 2023-07-08 ENCOUNTER — OFFICE VISIT (OUTPATIENT)
Dept: FAMILY MEDICINE CLINIC | Facility: CLINIC | Age: 53
End: 2023-07-08
Payer: COMMERCIAL

## 2023-07-08 VITALS
HEART RATE: 97 BPM | WEIGHT: 180 LBS | RESPIRATION RATE: 18 BRPM | OXYGEN SATURATION: 100 % | DIASTOLIC BLOOD PRESSURE: 74 MMHG | TEMPERATURE: 97 F | BODY MASS INDEX: 30.73 KG/M2 | HEIGHT: 64 IN | SYSTOLIC BLOOD PRESSURE: 126 MMHG

## 2023-07-08 DIAGNOSIS — R39.9 UTI SYMPTOMS: Primary | ICD-10-CM

## 2023-07-08 LAB
APPEARANCE: CLEAR
BILIRUBIN: NEGATIVE
GLUCOSE (URINE DIPSTICK): NEGATIVE MG/DL
KETONES (URINE DIPSTICK): NEGATIVE MG/DL
MULTISTIX LOT#: ABNORMAL NUMERIC
NITRITE, URINE: NEGATIVE
PH, URINE: 5.5 (ref 4.5–8)
PROTEIN (URINE DIPSTICK): NEGATIVE MG/DL
SPECIFIC GRAVITY: 1.01 (ref 1–1.03)
URINE-COLOR: YELLOW
UROBILINOGEN,SEMI-QN: 0.2 MG/DL (ref 0–1.9)

## 2023-07-08 PROCEDURE — 87086 URINE CULTURE/COLONY COUNT: CPT | Performed by: NURSE PRACTITIONER

## 2023-07-08 PROCEDURE — 81003 URINALYSIS AUTO W/O SCOPE: CPT | Performed by: NURSE PRACTITIONER

## 2023-07-08 PROCEDURE — 3074F SYST BP LT 130 MM HG: CPT | Performed by: NURSE PRACTITIONER

## 2023-07-08 PROCEDURE — 3078F DIAST BP <80 MM HG: CPT | Performed by: NURSE PRACTITIONER

## 2023-07-08 PROCEDURE — 99213 OFFICE O/P EST LOW 20 MIN: CPT | Performed by: NURSE PRACTITIONER

## 2023-07-08 PROCEDURE — 3008F BODY MASS INDEX DOCD: CPT | Performed by: NURSE PRACTITIONER

## 2023-07-08 RX ORDER — NITROFURANTOIN 25; 75 MG/1; MG/1
100 CAPSULE ORAL 2 TIMES DAILY
Qty: 10 CAPSULE | Refills: 0 | Status: SHIPPED | OUTPATIENT
Start: 2023-07-08 | End: 2023-07-13

## 2023-08-11 NOTE — TELEPHONE ENCOUNTER
A refill request was received for:  Requested Prescriptions     Pending Prescriptions Disp Refills    VENLAFAXINE ER 75 MG Oral Capsule SR 24 Hr [Pharmacy Med Name: VENLAFAXINE HCL ER 75 MG CAP] 90 capsule 1     Sig: TAKE 1 CAPSULE BY MOUTH EVERY DAY       Last refill date:2/16/2023     My chart sent to patient for appointment  Last office visit: 2/22/2022    Follow up due:  No future appointments.

## 2023-08-14 RX ORDER — VENLAFAXINE HYDROCHLORIDE 75 MG/1
CAPSULE, EXTENDED RELEASE ORAL
Qty: 90 CAPSULE | Refills: 1 | OUTPATIENT
Start: 2023-08-14

## 2023-08-30 ENCOUNTER — OFFICE VISIT (OUTPATIENT)
Dept: FAMILY MEDICINE CLINIC | Facility: CLINIC | Age: 53
End: 2023-08-30
Payer: COMMERCIAL

## 2023-08-30 VITALS
HEART RATE: 89 BPM | WEIGHT: 186 LBS | BODY MASS INDEX: 31.76 KG/M2 | DIASTOLIC BLOOD PRESSURE: 84 MMHG | SYSTOLIC BLOOD PRESSURE: 136 MMHG | HEIGHT: 64 IN

## 2023-08-30 DIAGNOSIS — Z00.00 ROUTINE GENERAL MEDICAL EXAMINATION AT A HEALTH CARE FACILITY: Primary | ICD-10-CM

## 2023-08-30 DIAGNOSIS — Z12.31 ENCOUNTER FOR SCREENING MAMMOGRAM FOR BREAST CANCER: ICD-10-CM

## 2023-08-30 PROCEDURE — 3079F DIAST BP 80-89 MM HG: CPT | Performed by: INTERNAL MEDICINE

## 2023-08-30 PROCEDURE — 3008F BODY MASS INDEX DOCD: CPT | Performed by: INTERNAL MEDICINE

## 2023-08-30 PROCEDURE — 99396 PREV VISIT EST AGE 40-64: CPT | Performed by: INTERNAL MEDICINE

## 2023-08-30 PROCEDURE — 3075F SYST BP GE 130 - 139MM HG: CPT | Performed by: INTERNAL MEDICINE

## 2023-08-30 RX ORDER — VENLAFAXINE HYDROCHLORIDE 75 MG/1
75 CAPSULE, EXTENDED RELEASE ORAL DAILY
Qty: 90 CAPSULE | Refills: 3 | Status: SHIPPED | OUTPATIENT
Start: 2023-08-30

## 2023-09-08 ENCOUNTER — HOSPITAL ENCOUNTER (OUTPATIENT)
Dept: MAMMOGRAPHY | Age: 53
Discharge: HOME | End: 2023-09-08
Attending: INTERNAL MEDICINE
Payer: COMMERCIAL

## 2023-09-08 DIAGNOSIS — Z12.31 ENCOUNTER FOR SCREENING MAMMOGRAM FOR BREAST CANCER: ICD-10-CM

## 2023-09-08 LAB
ABSOLUTE BASOPHILS: 0 CELLS/UL (ref 0–200)
ABSOLUTE EOSINOPHILS: 0 CELLS/UL (ref 15–500)
ABSOLUTE LYMPHOCYTES: 1518 CELLS/UL (ref 850–3900)
ABSOLUTE MONOCYTES: 422 CELLS/UL (ref 200–950)
ABSOLUTE NEUTROPHILS: 4360 CELLS/UL (ref 1500–7800)
ALBUMIN/GLOBULIN RATIO: 1.8 (CALC) (ref 1–2.5)
ALBUMIN: 4.4 G/DL (ref 3.6–5.1)
ALKALINE PHOSPHATASE: 108 U/L (ref 37–153)
ALT: 44 U/L (ref 6–29)
AST: 26 U/L (ref 10–35)
BASOPHILS: 0 %
BILIRUBIN, TOTAL: 0.4 MG/DL (ref 0.2–1.2)
BUN: 15 MG/DL (ref 7–25)
CALCIUM: 9.2 MG/DL (ref 8.6–10.4)
CARBON DIOXIDE: 24 MMOL/L (ref 20–32)
CHLORIDE: 106 MMOL/L (ref 98–110)
CHOL/HDLC RATIO: 5.7 (CALC)
CHOLESTEROL, TOTAL: 229 MG/DL
CREATININE: 0.79 MG/DL (ref 0.5–1.03)
EGFR: 89 ML/MIN/1.73M2
EOSINOPHILS: 0 %
GLOBULIN: 2.5 G/DL (CALC) (ref 1.9–3.7)
GLUCOSE: 98 MG/DL (ref 65–99)
HDL CHOLESTEROL: 40 MG/DL
HEMATOCRIT: 40.5 % (ref 35–45)
HEMOGLOBIN: 13.5 G/DL (ref 11.7–15.5)
LDL-CHOLESTEROL: 161 MG/DL (CALC)
LYMPHOCYTES: 24.1 %
MCH: 28.2 PG (ref 27–33)
MCHC: 33.3 G/DL (ref 32–36)
MCV: 84.6 FL (ref 80–100)
MONOCYTES: 6.7 %
MPV: 10.5 FL (ref 7.5–12.5)
NEUTROPHILS: 69.2 %
NON-HDL CHOLESTEROL: 189 MG/DL (CALC)
PLATELET COUNT: 278 THOUSAND/UL (ref 140–400)
POTASSIUM: 4.8 MMOL/L (ref 3.5–5.3)
PROTEIN, TOTAL: 6.9 G/DL (ref 6.1–8.1)
RDW: 13.3 % (ref 11–15)
RED BLOOD CELL COUNT: 4.79 MILLION/UL (ref 3.8–5.1)
SODIUM: 141 MMOL/L (ref 135–146)
T4, FREE: 0.9 NG/DL (ref 0.8–1.8)
TRIGLYCERIDES: 146 MG/DL
TSH: 2.09 MIU/L
WHITE BLOOD CELL COUNT: 6.3 THOUSAND/UL (ref 3.8–10.8)

## 2023-09-08 PROCEDURE — 77067 SCR MAMMO BI INCL CAD: CPT | Performed by: INTERNAL MEDICINE

## 2023-09-08 PROCEDURE — 77063 BREAST TOMOSYNTHESIS BI: CPT | Performed by: INTERNAL MEDICINE

## 2023-09-09 DIAGNOSIS — E78.00 HIGH CHOLESTEROL: Primary | ICD-10-CM

## 2023-11-30 ENCOUNTER — HOSPITAL ENCOUNTER (OUTPATIENT)
Dept: GENERAL RADIOLOGY | Age: 53
Discharge: HOME | End: 2023-11-30
Attending: STUDENT IN AN ORGANIZED HEALTH CARE EDUCATION/TRAINING PROGRAM
Payer: COMMERCIAL

## 2023-11-30 DIAGNOSIS — M54.50 LOW BACK PAIN, UNSPECIFIED BACK PAIN LATERALITY, UNSPECIFIED CHRONICITY, UNSPECIFIED WHETHER SCIATICA PRESENT: Primary | ICD-10-CM

## 2023-11-30 DIAGNOSIS — M54.50 LOW BACK PAIN, UNSPECIFIED BACK PAIN LATERALITY, UNSPECIFIED CHRONICITY, UNSPECIFIED WHETHER SCIATICA PRESENT: ICD-10-CM

## 2023-11-30 PROCEDURE — 72100 X-RAY EXAM L-S SPINE 2/3 VWS: CPT | Performed by: STUDENT IN AN ORGANIZED HEALTH CARE EDUCATION/TRAINING PROGRAM

## 2023-12-01 ENCOUNTER — OFFICE VISIT (OUTPATIENT)
Dept: ORTHOPEDICS CLINIC | Facility: CLINIC | Age: 53
End: 2023-12-01
Payer: COMMERCIAL

## 2023-12-01 VITALS — HEIGHT: 64 IN | BODY MASS INDEX: 31.76 KG/M2 | WEIGHT: 186 LBS

## 2023-12-01 DIAGNOSIS — M51.36 DEGENERATIVE DISC DISEASE, LUMBAR: Primary | ICD-10-CM

## 2023-12-01 PROCEDURE — 3008F BODY MASS INDEX DOCD: CPT | Performed by: STUDENT IN AN ORGANIZED HEALTH CARE EDUCATION/TRAINING PROGRAM

## 2023-12-01 PROCEDURE — 99204 OFFICE O/P NEW MOD 45 MIN: CPT | Performed by: STUDENT IN AN ORGANIZED HEALTH CARE EDUCATION/TRAINING PROGRAM

## 2023-12-01 RX ORDER — MELOXICAM 7.5 MG/1
7.5 TABLET ORAL DAILY
Qty: 30 TABLET | Refills: 0 | Status: SHIPPED | OUTPATIENT
Start: 2023-12-01

## 2024-03-05 ENCOUNTER — OFFICE VISIT (OUTPATIENT)
Dept: FAMILY MEDICINE CLINIC | Facility: CLINIC | Age: 54
End: 2024-03-05
Payer: COMMERCIAL

## 2024-03-05 DIAGNOSIS — E78.00 HIGH CHOLESTEROL: ICD-10-CM

## 2024-03-05 DIAGNOSIS — Z82.49 FAMILY HISTORY OF EARLY CAD: ICD-10-CM

## 2024-03-05 DIAGNOSIS — I10 PRIMARY HYPERTENSION: Primary | ICD-10-CM

## 2024-03-05 LAB
CHOL/HDLC RATIO: 5.2 (CALC)
CHOLESTEROL, TOTAL: 241 MG/DL
HDL CHOLESTEROL: 46 MG/DL
LDL-CHOLESTEROL: 168 MG/DL (CALC)
NON-HDL CHOLESTEROL: 195 MG/DL (CALC)
TRIGLYCERIDES: 130 MG/DL

## 2024-03-05 PROCEDURE — 99214 OFFICE O/P EST MOD 30 MIN: CPT | Performed by: INTERNAL MEDICINE

## 2024-03-05 RX ORDER — ROSUVASTATIN CALCIUM 5 MG/1
5 TABLET, COATED ORAL NIGHTLY
Qty: 90 TABLET | Refills: 1 | Status: SHIPPED | OUTPATIENT
Start: 2024-03-05

## 2024-03-05 RX ORDER — LISINOPRIL 5 MG/1
TABLET ORAL
Qty: 30 TABLET | Refills: 1 | Status: SHIPPED | OUTPATIENT
Start: 2024-03-05

## 2024-03-05 NOTE — PROGRESS NOTES
Neha Wells is a 54 year old female.  HPI:     Follow up BPs and cholesterol.  177/90 this morning.  143 systolic later.  Work is very stressful.  Sleep is fair, depending on night sweats.  Not exercising.   Works from home which can get in the way.  Father passed from AMI in his 40s.  Siblings with CAD.    Current Outpatient Medications   Medication Sig Dispense Refill    Meloxicam 7.5 MG Oral Tab Take 1 tablet (7.5 mg total) by mouth daily. 30 tablet 0    venlafaxine ER 75 MG Oral Capsule SR 24 Hr Take 1 capsule (75 mg total) by mouth daily. 90 capsule 3    Levonorgestrel (MIRENA, 52 MG,) 20 MCG/24HR Intrauterine IUD 20 mcg (1 each total) by Intrauterine route once.      Multiple Vitamins-Minerals (MULTI-VITAMIN/MINERALS) Oral Tab Take 1 tablet by mouth daily.        Past Medical History:   Diagnosis Date    Abdominal pain     Back pain     Bloating     Decorative tattoo     Dizziness 2+ years    Due to Menopause    Fatigue 2+ years    Due to being complacent during covid    Flatulence/gas pain/belching     Frequent urination 5+ years    H/O tooth extraction     Heavy menses 2+ years    Had IUD implanted to help    Herpes zoster without mention of complication 6/29/2012    Irregular bowel habits 10+ years    Not formally diagnosed    Leaking of urine 5+ years    Night sweats 2+ years ago    Due to Menopause    Unspecified sinusitis (chronic) 6/29/2012    Wears glasses     Just for reading      Social History:  Social History     Socioeconomic History    Marital status:    Tobacco Use    Smoking status: Never    Smokeless tobacco: Never   Vaping Use    Vaping Use: Never used   Substance and Sexual Activity    Alcohol use: Yes     Alcohol/week: 0.0 standard drinks of alcohol     Comment: Occasional    Drug use: No    Sexual activity: Yes     Partners: Male     Birth control/protection: I.U.D.     Comment: mirena   Other Topics Concern    Caffeine Concern Yes     Comment: 2 cans of diet coke daily     Exercise No        REVIEW OF SYSTEMS:   GENERAL HEALTH: feels well otherwise  SKIN: denies any unusual skin lesions or rashes  RESPIRATORY: denies shortness of breath or cough  CARDIOVASCULAR: denies chest pain or pressure  GI: denies N/V/D; denies abdominal pain    : denies dysuria, frequency or incontinence  NEURO: denies headaches, denies dizziness; denies numbness or tingling    EXAM:   BP (!) 172/102   Pulse 107   Resp 20   Ht 5' 4\" (1.626 m)   Wt 190 lb (86.2 kg)   SpO2 98%   BMI 32.61 kg/m²   GENERAL: well developed, well nourished,in no apparent distress  SKIN: warm & dry  HEENT: atraumatic, normocephalic, TMs clear, throat clear  NECK: supple,no adenopathy   LUNGS: CTA, easy breathing  CV: normal S1S2, RRR without murmur     Lipids  (most recent labs)   Lab Results   Component Value Date/Time    CHOLEST 241 (H) 03/04/2024 09:02 AM    TRIG 130 03/04/2024 09:02 AM    HDL 46 (L) 03/04/2024 09:02 AM     (H) 03/04/2024 09:02 AM    NONHDLC 195 (H) 03/04/2024 09:02 AM         ASSESSMENT AND PLAN:   1. Primary hypertension  - discussed goal range and lifestyle factors that can help control HTN  - CT CALCIUM SCORING; Future  - lisinopril 5 MG Oral Tab; 1 tab po daily. Take an additional tablet for top  or higher.  Dispense: 30 tablet; Refill: 1    2. High cholesterol  - dietary modifications discussed, exercise encouraged  - CT CALCIUM SCORING; Future  - rosuvastatin 5 MG Oral Tab; Take 1 tablet (5 mg total) by mouth nightly.  Dispense: 90 tablet; Refill: 1    3. Family history of early CAD  - discussed stricter goals with her FH  - CT CALCIUM SCORING; Future  - rosuvastatin 5 MG Oral Tab; Take 1 tablet (5 mg total) by mouth nightly.  Dispense: 90 tablet; Refill: 1      The patient indicates understanding of these issues and agrees to the plan.  Follow up 1 month, repeat labs in 3 months.

## 2024-03-13 VITALS
DIASTOLIC BLOOD PRESSURE: 80 MMHG | OXYGEN SATURATION: 98 % | HEART RATE: 107 BPM | WEIGHT: 190 LBS | BODY MASS INDEX: 32.44 KG/M2 | HEIGHT: 64 IN | SYSTOLIC BLOOD PRESSURE: 164 MMHG | RESPIRATION RATE: 20 BRPM

## 2024-03-28 DIAGNOSIS — I10 HYPERTENSION, UNSPECIFIED TYPE: ICD-10-CM

## 2024-03-28 NOTE — TELEPHONE ENCOUNTER
Pt.requesting 90 day supply    A refill request was received for:  Requested Prescriptions     Pending Prescriptions Disp Refills    lisinopril 10 MG Oral Tab 30 tablet 0     Sig: Take 1 tablet (10 mg total) by mouth daily.       Last refill date:03/27/24       Last office visit:03/05/24  Future Appointments   Date Time Provider Department Center   4/10/2024 10:00 AM PF [facility]   4/25/2024  4:00 PM Jossy Hackett NP [facility] [address]

## 2024-03-30 RX ORDER — LISINOPRIL 10 MG/1
10 TABLET ORAL DAILY
Qty: 30 TABLET | Refills: 0 | OUTPATIENT
Start: 2024-03-30 | End: 2024-04-29

## 2024-04-10 ENCOUNTER — HOSPITAL ENCOUNTER (OUTPATIENT)
Dept: CT IMAGING | Age: 54
Discharge: HOME | End: 2024-04-10
Attending: INTERNAL MEDICINE

## 2024-04-10 VITALS
HEIGHT: 64.02 IN | WEIGHT: 186 LBS | BODY MASS INDEX: 31.76 KG/M2 | SYSTOLIC BLOOD PRESSURE: 138 MMHG | DIASTOLIC BLOOD PRESSURE: 80 MMHG

## 2024-04-10 DIAGNOSIS — I10 PRIMARY HYPERTENSION: ICD-10-CM

## 2024-04-10 DIAGNOSIS — E78.00 HIGH CHOLESTEROL: ICD-10-CM

## 2024-04-10 DIAGNOSIS — Z82.49 FAMILY HISTORY OF EARLY CAD: ICD-10-CM

## 2024-04-10 LAB
GLUCOSE POC: 93 MG/DL (ref 70–100)
HDL POC: 34 MG/DL (ref 40–60)
LDL POC: 66 MG/DL (ref 0–130)
TC/HDL RATIO: 4 (ref 0–5)
TOTAL CHOLESTEROL POC: 117 MG/DL (ref 0–200)
TRIGLYCERIDES POC: 85 MG/DL (ref 0–200)

## 2024-04-10 NOTE — PROGRESS NOTES
Date of Service 4/10/2024    VAL MIMS  Date of Birth 1970    Patient Age: 54 year old    PCP: Kishore Soler MD   [address] [place] [id number]    Heart Scan Consult  Preliminary Heart Scan Score: 0    Previous Screening  Heart Scan Completed Previously: Yes  Year of last heart scan:   Score of last heart scan: 0  Peripheral Vascular Scan Completed Previously: No          Risk Factors  Personal Risk Factors  Non-alterable Risk Factors: Personal History;Family History (Dad  of heart attack at age 41.)  Alterable Risk Factors: High Blood Pressure;Abnormal Cholesterol;Lack of exercise;Obesity      Body Mass Index  Body mass index is 31.91 kg/m².    Blood Pressure     /80 (BP Location: Left arm)     (Normal =< 120/80,  Elevated = 120-129/ >80,  High Stage1 130-139/80-89 , Stage2 >140/>90)    Lipid Profile  Patient was in fasting state: Yes    Cholesterol: 117, done on 4/10/2024.  HDL Cholesterol: 34, done on 4/10/2024.  LDL Cholesterol: 66, done on 4/10/2024.  TriGlycerides 85, done on 4/10/2024.    Cholesterol Goals  Value   Total  =< 200   HDL  = > 45 Men = > 55 Women   LDL   =< 100   Triglycerides  =< 150       Glucose and Hemoglobin A1C  Lab Results   Component Value Date    GLU 93 04/10/2024     (Normal Fasting Glucose < 100mg/dl )    Nurse Review  Risk factor information and results reviewed with Nurse: Yes    Recommended Follow Up:  Consult your physician regarding:: Final Heart Scan Report;Discuss potential for Incidental Finding;Discuss Potential for Score Variance;Cholesterol Results (Fasting)      Recommendations for Change:  Nutrition Changes: Low Saturated Fat;Increase Fiber;Low Fat Dairy (Patient eats mostly chicken. Encouraged more fiber.)    Cholesterol Modification (goal of therapy depends upon your risk): No Change Needed    Exercise: Enhance Current Program (Walks and plans to increase exercise gradually. Has underdesk treadmill.)    Smoking Cessation: No Change  Needed    Weight Management: Decrease Current Weight         Repeat Heart Scan: 5 years if Calcium Score is 0.0;Discuss with your Physician              [place] Recommended Resources:  Recommended Resources: Jumpstart Your Health [phone];Upcoming Classes, Medical Services and Health Library [email or web address]            Sophia CHANCE RN        Please Contact the Nurse Heart Line with any Questions or Concerns [phone].

## 2024-04-23 DIAGNOSIS — I10 HYPERTENSION, UNSPECIFIED TYPE: ICD-10-CM

## 2024-04-23 RX ORDER — LISINOPRIL 10 MG/1
10 TABLET ORAL DAILY
Qty: 30 TABLET | Refills: 0 | Status: SHIPPED | OUTPATIENT
Start: 2024-04-23 | End: 2024-04-25

## 2024-04-23 NOTE — TELEPHONE ENCOUNTER
A refill request was received for:  Requested Prescriptions     Pending Prescriptions Disp Refills    lisinopril 10 MG Oral Tab 30 tablet 0     Sig: Take 1 tablet (10 mg total) by mouth daily.       Last refill date:   3/27/2024    Last office visit: 3/5/2024    Follow up due:  Future Appointments   Date Time Provider Department Center   4/25/2024  4:00 PM Jossy Hackett NP [facility] [address]

## 2024-04-25 ENCOUNTER — OFFICE VISIT (OUTPATIENT)
Dept: FAMILY MEDICINE CLINIC | Facility: CLINIC | Age: 54
End: 2024-04-25
Payer: COMMERCIAL

## 2024-04-25 VITALS
SYSTOLIC BLOOD PRESSURE: 130 MMHG | HEIGHT: 64 IN | RESPIRATION RATE: 16 BRPM | BODY MASS INDEX: 31.58 KG/M2 | OXYGEN SATURATION: 98 % | WEIGHT: 185 LBS | HEART RATE: 79 BPM | DIASTOLIC BLOOD PRESSURE: 78 MMHG

## 2024-04-25 DIAGNOSIS — I10 HYPERTENSION, UNSPECIFIED TYPE: ICD-10-CM

## 2024-04-25 PROCEDURE — 99213 OFFICE O/P EST LOW 20 MIN: CPT | Performed by: INTERNAL MEDICINE

## 2024-04-25 RX ORDER — LISINOPRIL 10 MG/1
10 TABLET ORAL DAILY
Qty: 90 TABLET | Refills: 3 | Status: SHIPPED | OUTPATIENT
Start: 2024-04-25 | End: 2025-04-25

## 2024-04-25 NOTE — PROGRESS NOTES
HPI:   Neha presents for follow up of her hypertension.     Pt has been taking medications as instructed, no medication side effects, home BP monitoring in the range of 130's systolic and 70's diastolic.  Maintains low sodium diet: trying   Routine exercise: trying    -133 systolic  Activity: starting to walk a little when she can    CHOLESTEROL, TOTAL (mg/dL)   Date Value   03/04/2024 241 (H)   09/07/2023 229 (H)   02/25/2022 215 (H)     Total Cholesterol (POC) (mg/dl)   Date Value   04/10/2024 117     HDL CHOLESTEROL (mg/dL)   Date Value   03/04/2024 46 (L)   09/07/2023 40 (L)   02/25/2022 42 (L)     HDL (POC) (mg/dl)   Date Value   04/10/2024 34 (A)     LDL-CHOLESTEROL (mg/dL (calc))   Date Value   03/04/2024 168 (H)   09/07/2023 161 (H)   02/25/2022 148 (H)     LDL (POC) (mg/dl)   Date Value   04/10/2024 66     AST (U/L)   Date Value   09/07/2023 26   02/25/2022 18   01/14/2021 11 (L)   10/22/2018 18   01/13/2017 10 (L)   11/26/2012 13     ALT (U/L)   Date Value   09/07/2023 44 (H)   02/25/2022 20   01/14/2021 22   10/22/2018 24   01/13/2017 18   11/26/2012 8     BUN (mg/dL)   Date Value   01/14/2021 12   10/22/2018 16   01/13/2017 12     UREA NITROGEN (BUN) (mg/dL)   Date Value   09/07/2023 15   02/25/2022 17   11/26/2012 16     CREATININE (mg/dL)   Date Value   09/07/2023 0.79   02/25/2022 0.87   11/26/2012 0.86     Creatinine (mg/dL)   Date Value   01/14/2021 0.94   10/22/2018 0.84   01/13/2017 0.87       Current Outpatient Medications   Medication Sig Dispense Refill    lisinopril 10 MG Oral Tab Take 1 tablet (10 mg total) by mouth daily. 30 tablet 0    rosuvastatin 5 MG Oral Tab Take 1 tablet (5 mg total) by mouth nightly. 90 tablet 1    venlafaxine ER 75 MG Oral Capsule SR 24 Hr Take 1 capsule (75 mg total) by mouth daily. 90 capsule 3    Levonorgestrel (MIRENA, 52 MG,) 20 MCG/24HR Intrauterine IUD 20 mcg (1 each total) by Intrauterine route once.      Multiple Vitamins-Minerals  (MULTI-VITAMIN/MINERALS) Oral Tab Take 1 tablet by mouth daily.      Meloxicam 7.5 MG Oral Tab Take 1 tablet (7.5 mg total) by mouth daily. (Patient not taking: Reported on 4/25/2024) 30 tablet 0      Past Medical History:    Abdominal pain    Back pain    Bloating    Decorative tattoo    Dizziness    Due to Menopause    Fatigue    Due to being complacent during covid    Flatulence/gas pain/belching    Frequent urination    H/O tooth extraction    Heavy menses    Had IUD implanted to help    Herpes zoster without mention of complication    Irregular bowel habits    Not formally diagnosed    Leaking of urine    Night sweats    Due to Menopause    Unspecified sinusitis (chronic)    Wears glasses    Just for reading      Past Surgical History:   Procedure Laterality Date    Colonoscopy      Ronni biopsy stereo nodule 1 site left (cpt=19081)      2011 x's 2-benign    Ronni biopsy stereo nodule 2 site bilat (cpt=19081/81078)  5/2011  2 sites     BENIGN      Social History:    Social History     Socioeconomic History    Marital status:    Tobacco Use    Smoking status: Never    Smokeless tobacco: Never   Vaping Use    Vaping status: Never Used   Substance and Sexual Activity    Alcohol use: Yes     Alcohol/week: 0.0 standard drinks of alcohol     Comment: Occasional    Drug use: No    Sexual activity: Yes     Partners: Male     Birth control/protection: I.U.D.     Comment: mirena   Other Topics Concern    Caffeine Concern Yes     Comment: 2 cans of diet coke daily    Exercise No         REVIEW OF SYSTEMS:   GENERAL: feels well otherwise  SKIN: denies any unusual skin lesions or rashes  RESPIRATORY: denies shortness of breath with exertion  CV: denies chest pain, pressure or palpitations  NEURO: headaches resolved with improved blood pressure, no dizziness or weakness  ENDOCRINE: night sweats and perimenopause symptoms resolved     EXAM:   /78   Pulse 79   Resp 16   Ht 5' 4\" (1.626 m)   Wt 185 lb (83.9 kg)    SpO2 98%   BMI 31.76 kg/m²        Body mass index is 31.76 kg/m².    GENERAL: well developed pleasant female in no apparent distress  SKIN: warm and dry  HEENT: atraumatic, normocephalic  LUNGS: CTA, easy breathing  CV:normal  S1S2, RRR without murmur   GI: good BS's,no masses, HSM or tenderness  EXT: no  edema    ASSESSMENT AND PLAN:   HTN, Controlled.  Continue Lisinopril 10mg daily.  The patient indicates understanding of these issues and agrees to the plan.    No orders of the defined types were placed in this encounter.      Follow up for WWE in August.

## 2024-06-14 ENCOUNTER — OFFICE VISIT (OUTPATIENT)
Facility: CLINIC | Age: 54
End: 2024-06-14
Payer: COMMERCIAL

## 2024-06-14 VITALS
HEIGHT: 64 IN | BODY MASS INDEX: 31.86 KG/M2 | DIASTOLIC BLOOD PRESSURE: 80 MMHG | WEIGHT: 186.63 LBS | SYSTOLIC BLOOD PRESSURE: 132 MMHG | HEART RATE: 87 BPM

## 2024-06-14 DIAGNOSIS — Z12.31 ENCOUNTER FOR SCREENING MAMMOGRAM FOR BREAST CANCER: Primary | ICD-10-CM

## 2024-06-14 DIAGNOSIS — Z12.4 PAPANICOLAOU SMEAR FOR CERVICAL CANCER SCREENING: ICD-10-CM

## 2024-06-14 PROCEDURE — 88175 CYTOPATH C/V AUTO FLUID REDO: CPT | Performed by: OBSTETRICS & GYNECOLOGY

## 2024-06-14 PROCEDURE — 99396 PREV VISIT EST AGE 40-64: CPT | Performed by: OBSTETRICS & GYNECOLOGY

## 2024-06-14 PROCEDURE — 87624 HPV HI-RISK TYP POOLED RSLT: CPT | Performed by: OBSTETRICS & GYNECOLOGY

## 2024-06-14 NOTE — PROGRESS NOTES
Neha Wells is a 54 year old female  No LMP recorded. (Menstrual status: IUD - Intrauterine Device).   Chief Complaint   Patient presents with    Wellness Visit     Last pap 21 neg   Mirena inserted 21    .     Occasionally she has spotting it is rarely.  Sure she does not have hot flashes and night sweats because she is on the Effexor.  She has an older sister who did not stop her periods till later.  She has some vaginal dryness and uses over-the-counter lubricant.  Blood work is done with her primary.  She has had a colonoscopy.    OBSTETRICS HISTORY:  OB History    Para Term  AB Living   2 2 2     2   SAB IAB Ectopic Multiple Live Births           2      # Outcome Date GA Lbr Shahid/2nd Weight Sex Type Anes PTL Lv   2 Term 05 40w0d  7 lb 4 oz (3.289 kg) F NORMAL SPONT   NIKITA   1 Term 02 40w0d  7 lb 2 oz (3.232 kg) F NORMAL SPONT   NIKITA       GYNE HISTORY:  Periods spotting only    History   Sexual Activity    Sexual activity: Yes    Partners: Male    Birth control/ protection: I.U.D., Mirena     Comment: inserted         Hx Prior Abnormal Pap: No  Pap Date: 21  Pap Result Notes: neg        MEDICAL HISTORY:  Past Medical History:    Abdominal pain    Amenorrhea    Back pain    Bloating    Decorative tattoo    Dizziness    Due to Menopause    Fatigue    Due to being complacent during covid    Fibroids    Flatulence/gas pain/belching    Frequent urination    H/O tooth extraction    Heavy menses    Had IUD implanted to help    Herpes zoster without mention of complication    Hyperlipidemia    Hypertension    Irregular bowel habits    Not formally diagnosed    Leaking of urine    Night sweats    Due to Menopause    Unspecified sinusitis (chronic)    Urinary incontinence    Wears glasses    Just for reading       SURGICAL HISTORY:  Past Surgical History:   Procedure Laterality Date    Colonoscopy      Colposcopy, cervix w/upper adjacent vagina; w/biopsy(s), cervix       Insert intrauterine device      Ronni biopsy stereo nodule 1 site left (cpt=19081)      2011 x's 2-benign    Ronni biopsy stereo nodule 2 site bilat (cpt=19081/06868)  5/2011  2 sites     BENIGN       SOCIAL HISTORY:  Social History     Socioeconomic History    Marital status:      Spouse name: Not on file    Number of children: Not on file    Years of education: Not on file    Highest education level: Not on file   Occupational History    Not on file   Tobacco Use    Smoking status: Never    Smokeless tobacco: Never   Vaping Use    Vaping status: Never Used   Substance and Sexual Activity    Alcohol use: Yes     Alcohol/week: 1.0 standard drink of alcohol     Types: 1 Cans of beer per week    Drug use: Never    Sexual activity: Yes     Partners: Male     Birth control/protection: I.U.D., Mirena     Comment: inserted 2021   Other Topics Concern     Service Not Asked    Blood Transfusions Not Asked    Caffeine Concern No    Occupational Exposure Not Asked    Hobby Hazards Not Asked    Sleep Concern Not Asked    Stress Concern Yes    Weight Concern Yes    Special Diet No    Back Care Not Asked    Exercise Yes    Bike Helmet Not Asked    Seat Belt No    Self-Exams Not Asked   Social History Narrative    Not on file     Social Determinants of Health     Financial Resource Strain: Not on file   Food Insecurity: Not on file   Transportation Needs: Not on file   Physical Activity: Not on file   Stress: Not on file   Social Connections: Not on file   Housing Stability: Not on file       FAMILY HISTORY:  Family History   Problem Relation Age of Onset    Bleeding Disorders Mother     Other (grave's disease) Mother     Hypertension Mother     Heart Attack Father     Hypertension Father     High Cholesterol Father     Obesity Father     Breast Cancer Maternal Grandmother 55        approx age        MEDICATIONS:    Current Outpatient Medications:     lisinopril 10 MG Oral Tab, Take 1 tablet (10 mg total) by mouth  daily., Disp: 90 tablet, Rfl: 3    rosuvastatin 5 MG Oral Tab, Take 1 tablet (5 mg total) by mouth nightly., Disp: 90 tablet, Rfl: 1    venlafaxine ER 75 MG Oral Capsule SR 24 Hr, Take 1 capsule (75 mg total) by mouth daily., Disp: 90 capsule, Rfl: 3    Levonorgestrel (MIRENA, 52 MG,) 20 MCG/24HR Intrauterine IUD, 20 mcg (1 each total) by Intrauterine route once., Disp: , Rfl:     Multiple Vitamins-Minerals (MULTI-VITAMIN/MINERALS) Oral Tab, Take 1 tablet by mouth daily., Disp: , Rfl:     Meloxicam 7.5 MG Oral Tab, Take 1 tablet (7.5 mg total) by mouth daily. (Patient not taking: Reported on 4/25/2024), Disp: 30 tablet, Rfl: 0    ALLERGIES:  No Known Allergies      Review of Systems:  Constitutional:  Denies fatigue, night sweats, hot flashes  Gastrointestinal:  denies heartburn, abdominal pain, diarrhea or constipation  Genitourinary:  denies dysuria, incontinence, abnormal vaginal discharge, vaginal itching  Skin/Breast:  Denies any breast pain, lumps, or discharge.   Neurological:  denies headaches, extremity weakness or numbness.      PHYSICAL EXAM:   Constitutional: well developed, well nourished  Head/Face: normocephalic  Neck/Thyroid: thyroid symmetric, no thyromegaly, no nodules, no adenopathy  Breast: normal without palpable masses, tenderness, asymmetry, nipple discharge, nipple retraction or skin changes  Abdomen:  soft, nontender, nondistended, no masses  Skin/Hair: no unusual rashes or bruises   Extremities: no edema, no cyanosis  Psychiatric:  Oriented to time, place, person and situation. Appropriate mood and affect    Pelvic Exam:  External Genitalia: normal appearance, hair distribution, and no lesions  Urethral Meatus:  normal in size, location, without lesions and prolapse  Bladder:  No fullness, masses or tenderness  Vagina:  Normal appearance without lesions, no abnormal discharge  Cervix:  Normal without tenderness on motion without lesions Pap.  Strings seen  Uterus: normal in size, contour,  position, mobility, without tenderness  Adnexa: normal without masses or tenderness  Perineum: normal  Anus: no hemorroids     Assessment & Plan:  There are no diagnoses linked to this encounter.    Well woman exam.  Mammogram.

## 2024-06-18 DIAGNOSIS — I10 PRIMARY HYPERTENSION: ICD-10-CM

## 2024-06-18 RX ORDER — LISINOPRIL 5 MG/1
TABLET ORAL
Qty: 30 TABLET | Refills: 1 | Status: SHIPPED | OUTPATIENT
Start: 2024-06-18 | End: 2024-06-20

## 2024-06-20 DIAGNOSIS — I10 PRIMARY HYPERTENSION: ICD-10-CM

## 2024-06-20 LAB
.: NORMAL
.: NORMAL
HPV E6+E7 MRNA CVX QL NAA+PROBE: NEGATIVE

## 2024-06-20 RX ORDER — LISINOPRIL 5 MG/1
TABLET ORAL
Qty: 90 TABLET | Refills: 3 | Status: SHIPPED | OUTPATIENT
Start: 2024-06-20

## 2024-06-20 NOTE — TELEPHONE ENCOUNTER
Pt.requesting 90 day supply for it to be covered by insurance.    A refill request was received for:  Requested Prescriptions     Pending Prescriptions Disp Refills    lisinopril 5 MG Oral Tab 30 tablet 1     Sig: TAKE ONE TABLET BY MOUTH DAILY. TAKE AN ADDITIONAL TABLET FOR TOP  OR HIGHER       Last refill date:  06/18/24     Last office visit: 04/25/24    No future appointments.

## 2024-08-15 DIAGNOSIS — Z82.49 FAMILY HISTORY OF EARLY CAD: ICD-10-CM

## 2024-08-15 DIAGNOSIS — E78.00 HIGH CHOLESTEROL: ICD-10-CM

## 2024-08-15 RX ORDER — ROSUVASTATIN CALCIUM 5 MG/1
5 TABLET, COATED ORAL NIGHTLY
Qty: 90 TABLET | Refills: 1 | Status: SHIPPED | OUTPATIENT
Start: 2024-08-15

## 2024-08-15 NOTE — TELEPHONE ENCOUNTER
A refill request was received for:  Requested Prescriptions     Pending Prescriptions Disp Refills    rosuvastatin 5 MG Oral Tab 90 tablet 1     Sig: Take 1 tablet (5 mg total) by mouth nightly.       Last refill date:  03/05/24     Last office visit: 04/25/24      No future appointments.

## 2024-08-19 RX ORDER — VENLAFAXINE HYDROCHLORIDE 75 MG/1
75 CAPSULE, EXTENDED RELEASE ORAL DAILY
Qty: 90 CAPSULE | Refills: 3 | Status: SHIPPED | OUTPATIENT
Start: 2024-08-19

## 2024-09-11 ENCOUNTER — HOSPITAL ENCOUNTER (OUTPATIENT)
Dept: MAMMOGRAPHY | Age: 54
Discharge: HOME | End: 2024-09-11
Attending: OBSTETRICS & GYNECOLOGY
Payer: COMMERCIAL

## 2024-09-11 DIAGNOSIS — Z12.31 ENCOUNTER FOR SCREENING MAMMOGRAM FOR BREAST CANCER: ICD-10-CM

## 2024-09-11 PROCEDURE — 77063 BREAST TOMOSYNTHESIS BI: CPT | Performed by: OBSTETRICS & GYNECOLOGY

## 2024-09-11 PROCEDURE — 77067 SCR MAMMO BI INCL CAD: CPT | Performed by: OBSTETRICS & GYNECOLOGY

## 2024-12-25 DIAGNOSIS — I10 PRIMARY HYPERTENSION: ICD-10-CM

## 2024-12-27 NOTE — TELEPHONE ENCOUNTER
.A refill request was received for:  Requested Prescriptions     Pending Prescriptions Disp Refills    lisinopril 5 MG Oral Tab [Pharmacy Med Name: LISINOPRIL 5 MG TABLET] 180 tablet 1     Sig: TAKE 1 TABLET BY MOUTH DAILY. TAKE AN ADDITIONAL TABLET FOR TOP BLOOD PRESSURE 150 OR HIGHER       Last refill date:   6/20/24    Last office visit: 4/25/24    Follow up due:  No future appointments.

## 2024-12-28 RX ORDER — LISINOPRIL 10 MG/1
10 TABLET ORAL DAILY
Qty: 90 TABLET | Refills: 0 | Status: SHIPPED | OUTPATIENT
Start: 2024-12-28 | End: 2025-12-23

## 2024-12-28 RX ORDER — LISINOPRIL 10 MG/1
10 TABLET ORAL DAILY
Qty: 90 TABLET | Refills: 3 | OUTPATIENT
Start: 2024-12-28 | End: 2025-12-23

## 2025-02-09 DIAGNOSIS — Z82.49 FAMILY HISTORY OF EARLY CAD: ICD-10-CM

## 2025-02-09 DIAGNOSIS — E78.00 HIGH CHOLESTEROL: ICD-10-CM

## 2025-02-10 NOTE — TELEPHONE ENCOUNTER
A refill request was received for:  Requested Prescriptions     Pending Prescriptions Disp Refills    ROSUVASTATIN 5 MG Oral Tab [Pharmacy Med Name: ROSUVASTATIN CALCIUM 5 MG TAB] 90 tablet 1     Sig: TAKE 1 TABLET BY MOUTH EVERY DAY AT NIGHT       Last refill date: 08/15/24      Last office visit: 04/25/24      No future appointments.

## 2025-02-11 RX ORDER — ROSUVASTATIN CALCIUM 5 MG/1
5 TABLET, COATED ORAL NIGHTLY
Qty: 90 TABLET | Refills: 0 | Status: SHIPPED | OUTPATIENT
Start: 2025-02-11

## 2025-02-26 ENCOUNTER — PATIENT MESSAGE (OUTPATIENT)
Dept: ORTHOPEDICS CLINIC | Facility: CLINIC | Age: 55
End: 2025-02-26

## 2025-04-15 ENCOUNTER — OFFICE VISIT (OUTPATIENT)
Dept: FAMILY MEDICINE CLINIC | Facility: CLINIC | Age: 55
End: 2025-04-15
Payer: COMMERCIAL

## 2025-04-15 VITALS
HEIGHT: 64 IN | DIASTOLIC BLOOD PRESSURE: 84 MMHG | SYSTOLIC BLOOD PRESSURE: 136 MMHG | RESPIRATION RATE: 20 BRPM | OXYGEN SATURATION: 98 % | WEIGHT: 193 LBS | HEART RATE: 89 BPM | BODY MASS INDEX: 32.95 KG/M2

## 2025-04-15 DIAGNOSIS — R73.01 IFG (IMPAIRED FASTING GLUCOSE): ICD-10-CM

## 2025-04-15 DIAGNOSIS — Z71.3 ENCOUNTER FOR WEIGHT LOSS COUNSELING: Primary | ICD-10-CM

## 2025-04-15 LAB — HEMOGLOBIN A1C: 5.7 % (ref 4.3–5.6)

## 2025-04-15 PROCEDURE — 99213 OFFICE O/P EST LOW 20 MIN: CPT | Performed by: INTERNAL MEDICINE

## 2025-04-15 PROCEDURE — 83036 HEMOGLOBIN GLYCOSYLATED A1C: CPT | Performed by: INTERNAL MEDICINE

## 2025-04-15 NOTE — PROGRESS NOTES
The following individual(s) verbally consented to be recorded using ambient AI listening technology and understand that they can each withdraw their consent to this listening technology at any point by asking the clinician to turn off or pause the recording:    Patient name: Neha Wells      The 21st Century Cures Act makes medical notes like these available to patients in the interest of transparency. Please be advised this is a medical document. Medical documents are intended to carry relevant information, facts as evident, and the clinical opinion of the practitioner. The medical note is intended as peer to peer communication and may appear blunt or direct. It is written in medical language and may contain abbreviations or verbiage that are unfamiliar.       Neha Wells is a 55 year old female.  HPI:     History of Present Illness  Neha Wells is a 55 year old female who presents for evaluation of potential weight loss treatment options.    She is interested in exploring weight loss treatment options, particularly GLP-1 receptor agonists like Ozempic, which her  is currently using. Her  has been on Ozempic for three weeks and has lost nine pounds,  tolerating the medication well. Also noting that her 's type 1 diabetes may have facilitated his coverage for Ozempic.    Her menopausal symptoms are fairly tolerable. She has no personal history of thyroid cancer. Her mother had her thyroid removed and underwent radiation treatment for an overactive thyroid.      Current Medications[1]   Past Medical History[2]   Social History:  Short Social Hx on File[3]     REVIEW OF SYSTEMS:   GENERAL HEALTH: feels well otherwise  SKIN: denies any unusual skin lesions or rashes  RESPIRATORY: denies shortness of breath or cough  CARDIOVASCULAR: denies chest pain or pressure  GI: denies N/V/D; denies abdominal pain    : denies dysuria, frequency or incontinence  NEURO: denies headaches, denies dizziness;  denies numbness or tingling    EXAM:   /84   Pulse 89   Resp 20   Ht 5' 4\" (1.626 m)   Wt 193 lb (87.5 kg)   SpO2 98%   BMI 33.13 kg/m²   GENERAL: well developed, well nourished,in no apparent distress  SKIN: warm & dry  HEENT: atraumatic, normocephalic, TMs clear, throat clear  NECK: supple,no adenopathy   LUNGS: CTA, easy breathing  CV: normal S1S2, RRR without murmur  GI: good BS's,no masses, HSM or tenderness  EXT: no cyanosis, clubbing or edema    ASSESSMENT AND PLAN:     1. Encounter for weight loss counseling  - pt will call insurance to obtain preferred list of meds  - POC Hemoglobin A1C    2. IFG (impaired fasting glucose)  - continue routine exercise and high protein diet      The patient indicates understanding of these issues and agrees to the plan.  .           [1]   Current Outpatient Medications   Medication Sig Dispense Refill    ROSUVASTATIN 5 MG Oral Tab TAKE 1 TABLET BY MOUTH EVERY DAY AT NIGHT 90 tablet 0    lisinopril 10 MG Oral Tab Take 1 tablet (10 mg total) by mouth daily. 90 tablet 0    VENLAFAXINE ER 75 MG Oral Capsule SR 24 Hr TAKE 1 CAPSULE BY MOUTH EVERY DAY 90 capsule 3    Levonorgestrel (MIRENA, 52 MG,) 20 MCG/24HR Intrauterine IUD 20 mcg (1 each total) by Intrauterine route once.      Multiple Vitamins-Minerals (MULTI-VITAMIN/MINERALS) Oral Tab Take 1 tablet by mouth daily.     [2]   Past Medical History:   Abdominal pain    Amenorrhea    Back pain    Bloating    Decorative tattoo    Dizziness    Due to Menopause    Fatigue    Due to being complacent during covid    Fibroids    Flatulence/gas pain/belching    Frequent urination    H/O tooth extraction    Heavy menses    Had IUD implanted to help    Herpes zoster without mention of complication    Hyperlipidemia    Hypertension    Irregular bowel habits    Not formally diagnosed    Leaking of urine    Night sweats    Due to Menopause    Unspecified sinusitis (chronic)    Urinary incontinence    Wears glasses    Just for  reading   [3]   Social History  Socioeconomic History    Marital status:    Tobacco Use    Smoking status: Never    Smokeless tobacco: Never   Vaping Use    Vaping status: Never Used   Substance and Sexual Activity    Alcohol use: Yes     Alcohol/week: 1.0 standard drink of alcohol     Types: 1 Cans of beer per week    Drug use: Never    Sexual activity: Yes     Partners: Male     Birth control/protection: I.U.D., Mirena     Comment: inserted 2021   Other Topics Concern    Caffeine Concern No    Stress Concern Yes    Weight Concern Yes    Special Diet No    Exercise Yes    Seat Belt No

## 2025-04-15 NOTE — PATIENT INSTRUCTIONS
Call your insurance,  ask  what GLP injectable is on the preferred list for weight loss WITHOUT diabetes and not needing a prior authorization.

## 2025-04-18 ENCOUNTER — HOSPITAL ENCOUNTER (OUTPATIENT)
Dept: GENERAL RADIOLOGY | Age: 55
Discharge: HOME | End: 2025-04-18
Attending: STUDENT IN AN ORGANIZED HEALTH CARE EDUCATION/TRAINING PROGRAM
Payer: COMMERCIAL

## 2025-04-18 ENCOUNTER — OFFICE VISIT (OUTPATIENT)
Facility: CLINIC | Age: 55
End: 2025-04-18
Payer: COMMERCIAL

## 2025-04-18 DIAGNOSIS — M51.360 DEGENERATION OF INTERVERTEBRAL DISC OF LUMBAR REGION WITH DISCOGENIC BACK PAIN: ICD-10-CM

## 2025-04-18 DIAGNOSIS — M54.50 LOW BACK PAIN, UNSPECIFIED BACK PAIN LATERALITY, UNSPECIFIED CHRONICITY, UNSPECIFIED WHETHER SCIATICA PRESENT: Primary | ICD-10-CM

## 2025-04-18 DIAGNOSIS — M54.50 LOW BACK PAIN, UNSPECIFIED BACK PAIN LATERALITY, UNSPECIFIED CHRONICITY, UNSPECIFIED WHETHER SCIATICA PRESENT: ICD-10-CM

## 2025-04-18 PROCEDURE — 99214 OFFICE O/P EST MOD 30 MIN: CPT | Performed by: STUDENT IN AN ORGANIZED HEALTH CARE EDUCATION/TRAINING PROGRAM

## 2025-04-18 PROCEDURE — 72100 X-RAY EXAM L-S SPINE 2/3 VWS: CPT | Performed by: STUDENT IN AN ORGANIZED HEALTH CARE EDUCATION/TRAINING PROGRAM

## 2025-04-18 NOTE — PROGRESS NOTES
[facility] - [facility]  [address], [address], [place] [address]  [address] [place] [id number]  [phone]     FOLLOW-UP PATIENT VISIT    Name: Neha Wells   MRN: [id number]  Date: 4/18/2025     CC: degenerative disc disease      INTERVAL HISTORY:   Neha Wells is a 55 year old female  follow-up patient whom I have been treating conservatively. Patient returns today for reevaluation of back pain.     Patient was last seen approximately 1 year ago with predominantly low back pain symptoms.  Symptoms spanned many years. Patient continues to have low back pain without radicular symptoms.    Bowel and bladder symptoms: absent.    The patient has not had issues with balance and/or hand dexterity problems such as changes in penmanship or the use of buttons or zippers.      ROS: No fever/chills or other constitutional issues.    PE:   There were no vitals filed for this visit.  Estimated body mass index is 33.13 kg/m² as calculated from the following:    Height as of 4/15/25: 5' 4\" (1.626 m).    Weight as of 4/15/25: 193 lb (87.5 kg).    On physical examination, she is awake, alert and oriented x 3 and in no acute distress. Mood, affect and language are normal. She appears well developed and well nourished.  She walks without a nonantalgic, nonmyelopathic, non-Trendelenburg gait. Motor strength testing of the lower extremities shows 5/5 strength in hip flexors, knee extensors, ankle dorsiflexors, toe extensor, and gastroc-soleus complex.   Sensation is intact to light touch L2-S1 distributions bilaterally. Reflexes normal.     Radiographic Examination/Diagnostics:  XR personally viewed, independently interpreted and radiology report was reviewed.  X-ray of the lumbar spine demonstrates degenerative disk disease predominantly at L2-3 and L3-4      IMPRESSION: Neha Wells is a 55 year old female with degenerative disc disease    PLAN:   We had a detailed discussion  outlining the etiology, anatomy, pathophysiology, and natural history of degenerative disc disease.  - Provided home exercise program  - Referred to Physical Therapy: home exercise program, aerobic exercises, core strengthening and conditioning, possible manipulative therapy,  and modalities as indicated  - Referred patient to pain clinic for injection therapy    FOLLOW-UP:  We will see her back in follow-up in 6 months, or sooner if any problems arise. Patient understands and agrees with plan.      Dinh Joyner MD  Orthopedic Spine Surgeon  [facility]   [address] [place] [address].[email or web address]  t: [phone]   f: [phone]        This note was dictated using Dragon software.  While it was briefly proofread prior to completion, some grammatical, spelling, and word choice errors due to dictation may still occur.

## 2025-05-03 NOTE — PROGRESS NOTES
HISTORY OF PRESENT ILLNESS  Chief Complaint   Patient presents with    Weight Problem     Jossy parsons referral. No weight loss meds in the past. No regular exercise  WC 48  Neck 13       Neha Wells is a 55 year old female new to our office today for initiation of medical weight loss program, referred by PHILIPPE.  Patient presents today with c/o excess weight.    Reason/goal for weight loss: lose 20# in 6 months and 40-50# in 1 year.    Previous weight loss efforts in the past: diet    Past 6 months lifestyle interventions: yes, regular exercise    Reviewed [facility] patient contract. Readiness for Lifestyle change: 10/10, Interest in Medication: 10/10, Bariatric surgery interest: 0/10.    Barriers to weight loss: snacking    Wt Readings from Last 6 Encounters:   05/05/25 193 lb (87.5 kg)   04/15/25 193 lb (87.5 kg)   06/14/24 186 lb 9.6 oz (84.6 kg)   04/25/24 185 lb (83.9 kg)   04/10/24 186 lb (84.4 kg)   03/05/24 190 lb (86.2 kg)          Social hx and lifestyle reviewed:    How many meals do you eat out per week: not reported  Who is the primary cook in your home: patient    Please respond to the questions regarding your previous weight loss    How did you hear about the [facility]? Primary Care Provider   Previous weight loss efforts in the past/medication(s): Diet   Eating behaviors/patterns that have been barriers to weight loss success in the past: Like to snack, like food.   Please respond to the questions regarding a 24 hour food journal.  Include the average time you ate and the quantity/food preparation method.    List foods, qty and prep for breakfast: (1) cup Yogurt, nereida seeds, blueberry's, 1/8th cup granola   List foods, qty and prep for lunch. 5 tbs humus, cucumbers/ carrots.  pretzel thins (probably too many)   List foods, qty and prep for dinner. Italian Sausage, no bun.  2 cups of pasta salad   List foods, qty and prep for snacks. Yogurt bar (90 calories), random easter candy on  counter   List the types and qty of fluids consumed I have been drinking lots of water, this is new, I used to despise water and drink nothing but diet coke.   Please respond to the questions regarding lifestyle.    Tobacco use: No   Alcohol use: How many servings per week? 1   Supplements taken on a regular basis include: Multi vitamin, Vitamin D & Vitamin C   Please respond to the questions regarding exercise/activity    How many days per week are you active or exercise 1   What type of activities: outside walk, walking pad in basement   Perceived level of exertion on a scale of 1-5, with 5 being very intense:    Average stress level on a scale of 1-10, with 10 being extremely stressed: 10- stress   How do you cope with stress: I snack, my work is very stressful   Please respond to the questions regarding sleep    How many hours of uninterrupted sleep do you get a night: 8   How many times do you wake up in the night: 4   Do you feel rested in the morning: No   Do you snore: Yes   Do you have sleep apnea: No   Do you use: Dental Device     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  Marital status:  with 2 adult children  Support: yes    MEDICAL HISTORY  PMH reviewed:   Cardiac disorders: Dyslipidemia, HTN  Depression/anxiety: negative  Glaucoma: negative  Kidney stones: negative  Eating disorder: negative  Migraines: negative  Seizures: negative  Joint-related conditions: chronic LBP  Liver disease: fatty liver  Renal disease: negative  Diabetes: prediabetes  Thyroid disease: negative  Constipation: negative  Other pertinent hx: n/a  Sleep Apnea hx: negative  Cancer hx: negative  Cholecystectomy and/or gallstones: negative  Family or personal history of Pancreatic issues / Medullary Thyroid Cancer/MENS 2: negative  History of bariatric surgery: negative    FMH reviewed: obesity in parent/s or sibling: yes    REVIEW OF SYSTEMS  GENERAL: feels well otherwise  SKIN: denies any rashes to skin folds  HEENT:  snoring- yes  LUNGS: denies shortness of breath with exertion, no apnea  CARDIOVASCULAR: denies chest pain on exertion, denies palpitations or pedal edema  GI: denies abdominal pain.  No N/V/D/C  MUSCULOSKELETAL: see above  NEURO: denies headaches  PSYCH: denies change in behavior or mood, denies feeling sad or depressed. BED screen- negative.    EXAM    MBSAQIP Information  Patient type: Non-Surgical     Today's Body Fat Female: 55.24    Date of Initial Weight: 5/5/25 Initial Weight: 193 Today's Weight: 193   Weightloss to Date: 0   Weightloss Percentage: 0 5% Goal: 9.65 10% Goal: 19.3    Initial BMI: 33.6 Today's BMI: 33.65 Change in BMI: -0.05    Neck Circumference: 13  Waist Circumference Female: 48     Female Fat Mass: 106.61 Female Lean Mass: 86.39      Have any comorbidities improved since the last visit?   Hypertension DM 2 Dyslipidemia Osteoarthritis Sleep Apnea                 /80   Pulse 88   Resp 16   Ht 5' 3.5\" (1.613 m)   Wt 193 lb (87.5 kg)   BMI 33.65 kg/m² ,   BC not available. WC: 48 inches.  GENERAL: well developed, well nourished, in no apparent distress, obese  SKIN: warm, pink, dry without rashes to exposed area  EYES: conjunctiva pink, sclera non icteric, PERRLA  HEENT: atraumatic, normocephalic, O/p: Mallampati score- 2  NECK: supple, non tender, no adenopathy, no thyromegaly  LUNGS: CTA in all fields, breathing non labored  CARDIO: RRR without murmur, normal S1 and S2 without clicks or gallops, no pedal edema.   GI: +BS, soft, no masses, HSM or tenderness  MUSCULOSKELETAL: grossly intact  NEURO: Oriented times three  PSYCH: pleasant, cooperative, normal mood and affect    Lab Results   Component Value Date    WBC 6.3 09/07/2023    RBC 4.79 09/07/2023    HGB 13.5 09/07/2023    HCT 40.5 09/07/2023    MCV 84.6 09/07/2023    MCH 28.2 09/07/2023    MCHC 33.3 09/07/2023    RDW 13.3 09/07/2023     09/07/2023     Lab Results   Component Value Date    GLU 93 04/10/2024    BUN 15  09/07/2023    BUNCREA SEE NOTE: 09/07/2023    CREATSERUM 0.79 09/07/2023    ANIONGAP 5 01/14/2021    GFR 80 01/13/2017    GFRNAA 77 02/25/2022    GFRAA 89 02/25/2022    CA 9.2 09/07/2023    OSMOCALC 286 01/14/2021    ALKPHO 108 09/07/2023    AST 26 09/07/2023    ALT 44 (H) 09/07/2023    BILT 0.4 09/07/2023    TP 6.9 09/07/2023    ALB 4.4 09/07/2023    GLOBULIN 2.5 09/07/2023    AGRATIO 1.8 09/07/2023     09/07/2023    K 4.8 09/07/2023     09/07/2023    CO2 24 09/07/2023     Lab Results   Component Value Date    A1C 5.7 (A) 04/15/2025     Lab Results   Component Value Date    CHOLEST 117 04/10/2024    TRIG 85 04/10/2024    HDL 34 (A) 04/10/2024    LDL 66 04/10/2024    VLDL 23 01/14/2021    TCHDLRATIO 5.2 (H) 03/04/2024    NONHDLC 195 (H) 03/04/2024    CHOLHDLRATIO 4 04/10/2024     Lab Results   Component Value Date    T4F 0.9 09/07/2023    TSH 2.09 09/07/2023    TSHT4 1.48 11/26/2012     Lab Results   Component Value Date    B12 424 10/22/2018     Lab Results   Component Value Date    VITD 13.2 (L) 10/22/2018       Medications Ordered Prior to Encounter[1]    ASSESSMENT  Initial Weight Data and Goal Weight Loss:  Weight Calculations  Initial Weight: 193 lbs  Initial Weight Date: 05/05/25  Today's Weight: 193 lbs  5% Goal: 9.65  10% Goal: 19.3  Total Weight Loss: 0 lbs    Diagnoses and all orders for this visit:    Encounter for therapeutic drug monitoring  -     Vitamin B12  -     CBC With Differential With Platelet  -     VITAMIN D, 25-HYDROXY [[id number]][Q]  -     semaglutide-weight management (WEGOVY) 0.5 MG/0.5ML Subcutaneous Solution Auto-injector; Inject 0.5 mL (0.5 mg total) into the skin once a week. Finish sample of Wegovy .25 mg weekly dose x4 weeks, then start this next dose.  -     semaglutide-weight management 0.25 MG/0.5ML Subcutaneous Solution Auto-injector; Inject 0.5 mL (0.25 mg total) into the skin once a week for 4 doses.    Class 1 obesity with serious comorbidity and body mass index (BMI)  of 33.0 to 33.9 in adult, unspecified obesity type  - Start Wegovy via sample start today and as directed.  - Reviewed balanced plate nutrition with focus on whole food, regular meals daily that include protein and produce and eliminating/reducing late night eating.  - Counseled on the 4 Pillars of health (sleep, stress, nutrition and fitness).  - Reviewed weight synopsis in EMR  - Instructed to use contraception at all times while on AOMs.  Comments:  Baseline BMI: 33.65 (5/5/25)  Orders:  -     Vitamin B12  -     CBC With Differential With Platelet  -     [facility] REFERRAL TO [facility] ([facility] USE ONLY)  -     VITAMIN D, 25-HYDROXY [[id number]][Q]  -     semaglutide-weight management (WEGOVY) 0.5 MG/0.5ML Subcutaneous Solution Auto-injector; Inject 0.5 mL (0.5 mg total) into the skin once a week. Finish sample of Wegovy .25 mg weekly dose x4 weeks, then start this next dose.  -     semaglutide-weight management 0.25 MG/0.5ML Subcutaneous Solution Auto-injector; Inject 0.5 mL (0.25 mg total) into the skin once a week for 4 doses.    Hypertension, unspecified type  -     Vitamin B12  -     CBC With Differential With Platelet  -     OP REFERRAL TO [facility] ([facility] USE ONLY)  -     VITAMIN D, 25-HYDROXY [[id number]][Q]  -     semaglutide-weight management (WEGOVY) 0.5 MG/0.5ML Subcutaneous Solution Auto-injector; Inject 0.5 mL (0.5 mg total) into the skin once a week. Finish sample of Wegovy .25 mg weekly dose x4 weeks, then start this next dose.  -     semaglutide-weight management 0.25 MG/0.5ML Subcutaneous Solution Auto-injector; Inject 0.5 mL (0.25 mg total) into the skin once a week for 4 doses.    Dyslipidemia  -     Vitamin B12  -     CBC With Differential With Platelet  -     OP REFERRAL TO [facility] ([facility] USE ONLY)  -     VITAMIN D, 25-HYDROXY [[id number]][Q]  -     semaglutide-weight management (WEGOVY) 0.5 MG/0.5ML Subcutaneous Solution Auto-injector; Inject 0.5 mL (0.5 mg total) into the skin once a week.  Finish sample of Wegovy .25 mg weekly dose x4 weeks, then start this next dose.  -     semaglutide-weight management 0.25 MG/0.5ML Subcutaneous Solution Auto-injector; Inject 0.5 mL (0.25 mg total) into the skin once a week for 4 doses.    Prediabetes  -     Vitamin B12  -     CBC With Differential With Platelet  -     OP REFERRAL TO [facility] ([facility] USE ONLY)  -     VITAMIN D, 25-HYDROXY [[id number]][Q]  -     semaglutide-weight management (WEGOVY) 0.5 MG/0.5ML Subcutaneous Solution Auto-injector; Inject 0.5 mL (0.5 mg total) into the skin once a week. Finish sample of Wegovy .25 mg weekly dose x4 weeks, then start this next dose.  -     semaglutide-weight management 0.25 MG/0.5ML Subcutaneous Solution Auto-injector; Inject 0.5 mL (0.25 mg total) into the skin once a week for 4 doses.    Fatty liver  -     semaglutide-weight management 0.25 MG/0.5ML Subcutaneous Solution Auto-injector; Inject 0.5 mL (0.25 mg total) into the skin once a week for 4 doses.    Chronic low back pain, unspecified back pain laterality, unspecified whether sciatica present  -     OP REFERRAL TO [facility] ([facility] USE ONLY)    Perimenopausal symptoms  Comments:  On Effexor for symptoms        PLAN  Medication use and side effects reviewed with patient.  Medication contraindications: Contrave with concurrent meds.  Follow up with dietitian and psychologist as recommended.  Discussed the role of sleep and stress in weight management.  Labs orders as above. Printed for [facility].  Counseled on comprehensive weight loss plan including attention to nutrition, exercise and behavior/stress management for success. See patient instruction below for more details.  Reviewed previous labs in EMR/Care Everywhere  Weight Loss Contract reviewed and signed.    Patient Instructions   Welcome to the [facility]...your Lifestyle Renovation begins now!  Thank you for placing your trust in our health care team, I look forward to  working with you along this journey to better health!    Next steps:     1.  Call our office at [phone] to schedule a personal nutrition consultation with one of our registered dieticians, Caleb Maddox. Bring along your food journal (3 days minimum). See journal options below.  2.  Complete fasting (10-12 hours, water only) labs at [email or web address] lab site prior to next office visit- printed orders. Lab results will be communicated via CAL - [employer].  3.  Use contraception at all times while on anti-obesity medications.  4.   Fill your prescribed medication and take as discussed and prescribed: Start sample of Wegovy at .25 mg weekly x4 weeks and then increase to .5 mg weekly. Send [employer] message with current home scale weight and status after 3rd pen in the .5 mg weekly box so I can send your next prescription. Visit [email or web address] and sign up for [employer] to receive medication savings card and FREE health coaching. If cost/supply prohibitive plan: consider generic alternative to Qsymia (www.qsymia.com) with Phentermine (pending EKG) and Topamax.      Tips while taking an injectable medication:    Be an intuitive eater. Listen to your hunger and fullness signals, stopping when you are full.  Consume protein and produce in your day, striving for a rainbow of color of produce.  Reduce portions to starting size of 1 cup and check in with your gut to see if you are full. Set the timer to slow down your eating pace to allow for 15-20 minutes to complete a meal and use the \"2 bite rule\".  Reduce refined sugars and high fat foods, as they may contribute to greater side effects of nausea and heartburn.  Stop eating 3 hours before bedtime to allow your food to digest.  Remain hydrated with water or non caloric and non caffeine beverages.  Use over the counter celina lozenge/supplement to help reduce nausea if needed.  If you have been off your medication for more than 2 weeks please notify our office to determine next dosing, as a  return to previous dose may not be appropriate or tolerated.  Wegovy can be kept at room temperature for up to 4 weeks.    Please try to work on the following dietary changes this first month:    1.  Drink water with meals and throughout the day, cut down on soda and/or juice if consumed. Consider flavored water options like Bubbly, Spindrift, Hint and Yonis. Reduce alcohol servings to 4 per week maximum.  2.  Have protein with each meal, examples include: greek yogurt, cottage cheese, hard boiled egg, tofu, chicken, fish, or tuna.  3.  Work towards reducing/eliminating refined carbohydrates and sugars which includes items such as sweets, as well as rice, pasta, and bread and make sure to choose whole grain options when having them with just 1 serving per meal about the size of your inner palm.  4.  Consume non starchy veggies daily working towards making them a good 50% of your daily food intake. Add them to lunch and dinner consistently.  5.  Start a daily probiotic: VSL#3 is recommended, (order on line at www.vsl3.com). Take 1 capsule daily with water for 30 days, then reduce to 1 every other day (this will reduce the cost). Capsules can be left out of refrigerator for 2 weeks. I recommend using a pill box weekly and keeping the bottle in the fridge.    Please download lorraine My Fitness Pal, LoseIt! Or My Net Diary to monitor daily dietary intake and you will be able to see if you are eating the right amount of calories or too much or too little which would hinder weight loss. Additionally this will help to see your daily carbohydrate and protein intake. When you set the lorraine up choose 1.5 lbs/week as a goal.  Keeping a paper food journal is an option as well to remain accountable for your choices- this is the start to mindful eating! A low calorie diet has been consistently shown to support weight loss.    Continue or start exercising to help establish a routine. If not already exercising begin with 1 day/week and  progress as able with the goal of working towards 30 minutes 5 days a week at a minimum. A variety or cardio, strength and stretching is important. Review resources below to help support you in building this healthy routine.    Meditation daily can help manage and control stress. Chronic stress can make weight loss difficult.  Exercising is one way to help with stress, but meditation using the CALM Ruth or another comparable alternative can be done in your home or place of work with little time commitment. This Ruth can also help work on behavior change and improve sleep. Check out the segment under Calm Masterclass and listen to The 4 Pillars of Health. A great way to begin learning about the foundation of lifestyle with practical tips to use in your every day. In addition, we offer counseling services and support for individual connection and care. A referral is necessary so please let me know if this is a service you are interested.    Check out www.yourweightmatters.org blog for continued support and education along your weight loss journey to optimal health!      Patient Resources:    Personal Training/Fitness Classes/Health Coaching    [facility] in [place]: Full fitness center with group fitness and personal training located in [place].  Health Coaching with Wanda Benjamin, Luis Fernando Mora, and Berhane Brooks at our [facility]- individual coaching to work on your health goals. Call [phone] and/or email @ [email or web address]. Free 60 minute consult when client of [facility].  NITISH Howard @ http://www.[employer]. A variety of group fitness options plus various yoga classes [phone] and/or email [email or web address]  Franc[facility] Hospital[facility] Fitness Centers with multiple locations: Modulus Video Fitness (www.Earshot.Blue Badge Style), F45 Training (www.h67pqldemod.Blue Badge Style), AutoVirt Body Bootcamp (www.SIMTEK.Blue Badge Style), Logic Nation  (www.Fashion Genome Project.Skimo TV), The Exercise  (www.exercisecoach.com), Club Pilates (www.clubpilates.Skimo TV)    Online Fitness  Fitness  on Utube  Fit in 10 DVD series   www.gynkz78YJM.Skimo TV  Chair exercises via Sit and Be Fit (www.sitandbefit.org) and Trig Medical Fitness (www.Cytogel Pharma.com) or John Connelly or Arturo Palmer videos on YouTube.  Hip Hop Fit with Féilx Kinsgley at www.hiphopfit.net    Apps for on the Go Fitness  [employer] 7 Minute Workout (orange box with white 7) - free on the go HIIT training ruth  Peloton Ruth @ www.onepeloton.com    Nutrition Trackers, Meal Preparation, and Other Meal Programs  LoseIT! And My Fitness Pal apps and on line for tracking nutrition  NOOM - virtual health coaching  FitFoundation (healthy meals on the go) in [place] @ www.lvjnklubbmppw2aWeHack.It  Bistrhenri MD @ Live [employer] and Texuhs12 (calorie smart and low carb plans recommended) @ wwwIronwood Pharmaceuticalsdtcpgo31.com, Metabolic Meals @ www.CareLuLuMetabolicMeals.com - individual prepared meals to go  Gobble, Blue Apron, Home , Every Plate, Sunbasket- on line meal delivery programs for preparation at home  Meal Village in [place] for homemade meals to go @ www.mealvillage.Skimo TV  Diet Doctor @ www.dietdoctor.com - low carb swaps  ReciMe and Mealime ruth (grocery and meal planning)    Stress, Anxiety, Depression, Trauma  CALM meditation ruth (www.calm.com)  Headspace  Don't let anxiety run your life. Using the science of emotion regulation and mindfulness to overcome fear and worry by Chava Gee PsyD and Jose Batista MA.  The [employer] Podcast (September 27, 2023): 6 Magic Words That Stop Anxiety  What Happened to You?- a look at the impact trauma has on behavior written by Ebony Ojeda and Dr. Blaze Lyon  Whole Again by Dennys Briones - discovering your true self after trauma    Mindful Eating/The Hungry Brain  Am I Hungry? Mindful eating virtual  ruth (www.amihungry.com)  The Hungry Brain by Liliana Patel, PhD  Mindless  Eating by Zackary Marquez  Weight Loss Surgery Will Not Treat Food Addiction by Zaina Fiore Ph.D    Metabolic Dysfunction, Hormones and Cravings  Why We Get Sick? By Clarence Humphrey (insulin resistance)  Your Body in Balance: The New Science of Food, Hormones, and Health by Dr. Von Haas  The Complete Guide to fasting by Dr. Rodríguez  Fast Like a Girl by Dr. Deanna Banks  The M Factor (documentary on PBS about Menopause)  Sugar, Salt & Fat by Chelsea Scott, Ph.D, R.D.  The Truth About Sugar - documentary on sugar (Free on Avisena, https://youCS Networksu.be/9I6epoeXJ8s)  Presentation on SUGAR called Sugar: The Bitter Truth by Dr. Kishore Bowers (Avisena) https://fundfindru.be/dBnniua6-oM?si=[email or web address]  Reverse Visceral Fat: #1 Way to Increase Your Lifespan & End Inflammation with Dr. Zeeshan Anna on Utube @ https://fundfindru.be/nupPRnvUpJY?si=rr7repIyNFV1FuuT    Nutrition Support  You Are What You Eat - Netfix series on twin study looking at impact of nutrition changes on health  The End of Dieting: How to Live for Life by Dr. Daron Darden M.D. or listen to The [employer] Podcast Episode 63: Understanding \"Nutritarian\" Eating w/Dr. Daron Darden  The Game Changers- eDiets.comix Documentary on plant based nutrition  The Dr. Decker T5 Wellness Plan by Dr. Edwin Decker MD  The Complete Guide to fasting by Dr. Rodríguez  @"[employer] Inc."ix (Instagram Dietician with support surrounding nutrition and meal prep/planning)    Education, Motivation and Support Resources  Live to 100: Secrets of the Blue Zones - Netflix series on the secrets to communities living over 100 years old  Atomic Habits by Antonio Skinner (a book about taking small steps to promote greater behavior change)   Motivation lorraine (black box with white \")- daily supportive messages sent to your phone  Can't Hurt Me by Chava Andrea (a book exploring the power of discipline in achieving your goals)  Fed Up - documentary about obesity (Free on Avisena)  Www.yourXLV Diagnosticsmatters.org - Obesity Action  Coalition sponsored Blog posts  Obesity Action Coalition Resources on topics specific to weight management (www.obesityaction.org)  Fitlosophy Fitspiration - journal to better health (journal book found at [employer] in fitness aisle)  Hillary Doe talk titled: The Call to Courage (Netflix)  The Exam Room by the Physician's Committee (Podcast)  Nutrition Facts by Dr. Benavides (Podcast)    Balanced Nutrition includes:     Build the mentality of Food 4 Fuel. Clean eating with whole foods and eliminating/reducing ultra processed foods.  Be an intuitive eater and using mindful eating practices.  Eat a balanced plate with protein and produce at all meals: 1/4 plate- protein, 1/2 plate non starchy veggies, and 1/4 plate fruit or complex carbohydrate.  Drink water with all meals and use a salad plate to naturally reduce portions.  Eliminate/reduce late night eating by stopping after 7pm. Allowing your body to fast for 12 hours (drink only water, tea or black coffee without any additives).            Mindful Eating Tips:  When we sit down to a meal by ourselves or with friends, it's easy to zone out and disconnect from our bodies. But, if you approach eating a meal with the intent to stay mindful and present, you will be able to enjoy yourself and walk away from the table feeling good about yourself and your choices. Here are several tips to keep in mind when it comes to food and eating.    Choose for yourself: Do not get hung up on what other people are eating. Instead, ask yourself what you would like to eat.    Forget about good and bad: Remind yourself that foods fall on a nutritional continuum (high value/low value), not on a moral continuum (good/bad).    Stay clear of guilt or shame: Refrain from allowing guilt or shame to contaminate your eating decisions. Avoid secret eating and get clear on who you are really hiding from if you eat in secret.    Choose foods that you like: Do not eat foods that you do not find satisfying  or enjoyable. Eating that way will make you feel like you are on a diet.    Look before you eat: Before you eat, look at your food, its portion size, and presentation. Breathe deeply. Look again before taking a mouthful.    Chew every mouthful: Chewing a lot helps to thoroughly release the flavor of foods. Let food sit on your tongue. This allows your taste buds to absorb the flavor and transmit messages about your appetite to your brain.    Talk or eat: When you are talking, stop eating. When you are eating, stop talking.    Stay connected: Pay attention to your body's appetite signals while you are eating.    Pause while you are eating: Think about how you are feeling about your food in terms of quality and quantity.    Know when to stop eating: Stop eating when flavor intensity declines, as it is bound to do. Do not try to polish off all of the food in front of you. Instead, aim for the moment when flavor peaks and you feel an internal “ah” of satisfaction--then stop.    Evaluate how full you are: Keep asking yourself while you are eating, “Am I still hungry?” and “Am I satisfied?”    by Wanda [facility]  and       YOGA IS NOT A 4-LETTER WORD    by Adenike nEglish    [employer] Fall 2012  https://www.obesityaction.org/resources/yoga-is-not-a-4-letter-word/    DISCLAIMER: To develop an exercise program that best suits your needs, please consult with your physician. It is important to talk with your doctor before beginning any exercise program.    Losing weight and improving your health can often be a difficult journey. You may feel limited to the types of exercises you can perform. Yoga is an excellent low-impact exercise to help you strengthen your core and increase flexibility. The benefits of yoga can be yours regardless of your size!    What can yoga do for you?  Yoga is a discipline that’s thousands of years old. The main physical benefits of yoga are well  documented:    Reduce Stress  Improve Balance  Increase Flexibility  Develop Core Strength    People come to the yoga mat wanting their physical bodies to change. However, it’s the feeling of well-being that brings people back to their yoga practice.    When asked, “What do you do?” I often see the look of disbelief creep across faces as I reply, “I’m a .” As a woman affected by obesity, I do not fit the image of a , marathoner or triathlete. Yet, that is who I am.    I see this same look of disbelief when I tell a person affected by obesity that they can do yoga right now in the body that they have today. Countless times I’ve been told that someone would do yoga, but only after they’ve lost weight. Unfortunately, this eliminates yoga as a tool for reclaiming their health based on their idea that yoga is only for the already thin and flexible. In fact, yoga can be done by everyone -- lying in bed, sitting in a wheelchair or standing only for brief moments, the benefits of yoga can still be yours.    Yoga as a Valuable Tool for Weight-loss  First time yoga students are often surprised at how much energy and effort it takes to come into and hold even the most simple of yoga poses. However, with yoga, it is not just calorie burning that best supports weight-loss.    Often, options to reclaim your health can be overwhelming. What to do is only the beginning of the process, as the answers to “who, where and when” can cloud and confuse the mind -- leading to no action at all. The easiest way to quiet the mind for clearer thinking is do Deep Belly Breathing (see sidebar) and focus on the words “inhale” and “exhale” to the rhythm of your breath.    Mindfulness is another benefit of yoga that’s often overlooked. Setting your intentions or goals is an important footprint to your success. Some programs require close attention to guidelines to ensure health. Bringing awareness to your life choices, yoga  encourages and reminds you to match your actions to your goals.    How to Start Your Yoga Practice  The best way to start a yoga practice is to sprinkle yoga into your daily life. Little pieces of yoga throughout the day will bring you huge benefits with ease - Deep Belly Breathing at a red light or during commercials while watching TV; standing in Mountain pose (see sidebar) while your coffee brews or the microwave is cooking; or gentle seated twists (see sidebar) in front of the computer while it starts or when you find yourself on hold. Taking any movement you have and holding it a little longer, reaching a little farther, will all be beneficial even if you’re confined to a bed.    When you decide it’s time to find a , location is still the first question to answer. You are more likely to be consistent if the class is convenient to your home or workplace. Call or contact the teacher by email to have a chance to talk about what you want and if they have a class that will fit your needs. Remember, you are the customer. Coming early to class will enable you to find a spot that supports your comfort zone. Some of us like to be in the back or close to a wall. Others want to be up front so we can see everything that’s going on.    If that first location isn’t for you, keep trying. Know that the right teacher and the right class are out there for you. Don’t suffer or spend your money on a class that isn’t working for you, but don’t give up either.    Once you’ve found a class, give yourself permission not to do every pose that’s being taught. Listen for what the foundation movement is, as well as the benefit. From that information, move in a way that makes sense to your body. Go inward and remember -- there’s never any pain in yoga.    Yoga for Everyone!    3-Part Deep Belly Breath    This can be done anywhere and in any position -- seated, standing or even lying down. Breathing through your nose, soften  your diaphragm. Inhale deeply expanding your lungs from bottom, middle to top. On the exhale, release from top, middle to bottom. Go slow. Enjoy the expansion of your lung capacity while you improve your cardiovascular exchange. Great for stress reduction -- I especially like to do this while waiting in the doctor’s office.    Simple Seated Twist    Sit so you’re not touching the back of your chair with your feet comfortably apart planted to the floor. On an exhale, reach across your body with your right hand to left knee or leg. The left hand goes back and can be supported on the seat or the back of the chair. Keep the energy of the hips grounded as you feel the twist deepen from hip to top of the head. Your eyes should look left in the direction of the twist. Breathe in shallow breaths that don’t interfere with the squeeze. Keep your belly relaxed. Hold this twist with the spine tall, even though it’s in rotation. Coming out, inhale back to center and twist to the other side. Twists are great to stimulate and cleanse all the organs and soft tissues of the torso.    Standing Mountain Pose    Stand with your feet at true hip-width apart. We often believe our hips are farther apart than they really are. Reach in from the front and find your hipbones. Place your feet directly below your new found hips -- it’s okay if your thighs squeeze! The important thing is to honor the alignment of your frame, not the flesh. Set your feet so the outside edges are parallel. This will make you feel a little pigeon-toed, causing your knees to be soft. (Never stand with locked knees.) Engage both your abdominal and gluteal core. A gentle press of the shoulder blades will open your heart center and give you a feeling of lightness. You can stand like this anywhere and no one will even know you’re practicing yoga.    To add arm work, on an inhale, lift the arms out to the sides and up overhead framing the head at the ears, palms facing  not touching. Hold and feel the energy from your feet to your fingertips. On an exhale, release the arms down and soften the whole body as you release the pose.    My 3 A’s  Yoga can bring to you what I call the Three A’s: Awareness, Acceptance and Affection. As you build your yoga practice, you’ll find yourself aware of your body in a new way. Your body’s edges will become clearer. Your everyday movements will deepen.    From awareness, you’ll begin to notice how different your body is day-to-day, and so begin to accept those differences -- especially the ones you can never change.    Finally, it is my deepest wish that you will come to love your body just as it is in the moment. Please remember, permanent changes come from love, not from hate -- and you deserve to be loved now and always.      Return in about 4 months (around 9/5/2025) for weight management via clinic or Telemedicine Visit and in clinic in December.    Patient verbalizes understanding.    Leonarda Macedo, RAJESH  5/3/2025    DOCUMENTATION OF TIME SPENT: Code selection for this visit was based on time spent : 60 minutes on date of service in preparing to see the patient, obtaining and/or reviewing separately obtained history, performing a medically appropriate examination, counseling and educating the patient/family/caregiver, ordering medications or testing, referring and communicating with other healthcare providers, documenting clinical information in the electronic medical record, independently interpreting results and communicating results to the patient/family/caregiver and care coordination with the patient's other providers.         [1]   Current Outpatient Medications on File Prior to Visit   Medication Sig Dispense Refill    ROSUVASTATIN 5 MG Oral Tab TAKE 1 TABLET BY MOUTH EVERY DAY AT NIGHT 90 tablet 0    lisinopril 10 MG Oral Tab Take 1 tablet (10 mg total) by mouth daily. 90 tablet 0    VENLAFAXINE ER 75 MG Oral Capsule SR 24 Hr TAKE 1  CAPSULE BY MOUTH EVERY DAY 90 capsule 3    Levonorgestrel (MIRENA, 52 MG,) 20 MCG/24HR Intrauterine IUD 20 mcg (1 each total) by Intrauterine route once.      Multiple Vitamins-Minerals (MULTI-VITAMIN/MINERALS) Oral Tab Take 1 tablet by mouth daily.       No current facility-administered medications on file prior to visit.

## 2025-05-05 ENCOUNTER — OFFICE VISIT (OUTPATIENT)
Dept: INTERNAL MEDICINE CLINIC | Facility: CLINIC | Age: 55
End: 2025-05-05
Payer: COMMERCIAL

## 2025-05-05 VITALS
SYSTOLIC BLOOD PRESSURE: 130 MMHG | BODY MASS INDEX: 33.77 KG/M2 | HEART RATE: 88 BPM | WEIGHT: 193 LBS | RESPIRATION RATE: 16 BRPM | DIASTOLIC BLOOD PRESSURE: 80 MMHG | HEIGHT: 63.5 IN

## 2025-05-05 DIAGNOSIS — E78.5 DYSLIPIDEMIA: ICD-10-CM

## 2025-05-05 DIAGNOSIS — I10 HYPERTENSION, UNSPECIFIED TYPE: ICD-10-CM

## 2025-05-05 DIAGNOSIS — K76.0 FATTY LIVER: ICD-10-CM

## 2025-05-05 DIAGNOSIS — E66.811 CLASS 1 OBESITY WITH SERIOUS COMORBIDITY AND BODY MASS INDEX (BMI) OF 33.0 TO 33.9 IN ADULT, UNSPECIFIED OBESITY TYPE: ICD-10-CM

## 2025-05-05 DIAGNOSIS — N95.1 PERIMENOPAUSAL SYMPTOMS: ICD-10-CM

## 2025-05-05 DIAGNOSIS — G89.29 CHRONIC LOW BACK PAIN, UNSPECIFIED BACK PAIN LATERALITY, UNSPECIFIED WHETHER SCIATICA PRESENT: ICD-10-CM

## 2025-05-05 DIAGNOSIS — M54.50 CHRONIC LOW BACK PAIN, UNSPECIFIED BACK PAIN LATERALITY, UNSPECIFIED WHETHER SCIATICA PRESENT: ICD-10-CM

## 2025-05-05 DIAGNOSIS — R73.03 PREDIABETES: ICD-10-CM

## 2025-05-05 DIAGNOSIS — Z51.81 ENCOUNTER FOR THERAPEUTIC DRUG MONITORING: Primary | ICD-10-CM

## 2025-05-05 PROCEDURE — 99205 OFFICE O/P NEW HI 60 MIN: CPT | Performed by: NURSE PRACTITIONER

## 2025-05-05 RX ORDER — SEMAGLUTIDE 0.5 MG/.5ML
0.5 INJECTION, SOLUTION SUBCUTANEOUS WEEKLY
Qty: 2 ML | Refills: 0 | Status: SHIPPED | OUTPATIENT
Start: 2025-05-05

## 2025-05-05 NOTE — PATIENT INSTRUCTIONS
Welcome to the [facility]...your Lifestyle Renovation begins now!  Thank you for placing your trust in our health care team, I look forward to working with you along this journey to better health!    Next steps:     1.  Call our office at [phone] to schedule a personal nutrition consultation with one of our registered dieticians, Caleb Maddox. Bring along your food journal (3 days minimum). See journal options below.  2.  Complete fasting (10-12 hours, water only) labs at [facility] lab site prior to next office visit- printed orders. Lab results will be communicated via GENWI.  3.  Use contraception at all times while on anti-obesity medications.  4.   Fill your prescribed medication and take as discussed and prescribed: Start sample of Wegovy at .25 mg weekly x4 weeks and then increase to .5 mg weekly. Send GENWI message with current home scale weight and status after 3rd pen in the .5 mg weekly box so I can send your next prescription. Visit www.wegovy.com and sign up for Shalonda to receive medication savings card and FREE health coaching. If cost/supply prohibitive plan: consider generic alternative to Qsymia (www.qsymia.com) with Phentermine (pending EKG) and Topamax.      Tips while taking an injectable medication:    Be an intuitive eater. Listen to your hunger and fullness signals, stopping when you are full.  Consume protein and produce in your day, striving for a rainbow of color of produce.  Reduce portions to starting size of 1 cup and check in with your gut to see if you are full. Set the timer to slow down your eating pace to allow for 15-20 minutes to complete a meal and use the \"2 bite rule\".  Reduce refined sugars and high fat foods, as they may contribute to greater side effects of nausea and heartburn.  Stop eating 3 hours before bedtime to allow your food to digest.  Remain hydrated with water or non caloric and non caffeine beverages.  Use over the counter  celina lozenge/supplement to help reduce nausea if needed.  If you have been off your medication for more than 2 weeks please notify our office to determine next dosing, as a return to previous dose may not be appropriate or tolerated.  Wegovy can be kept at room temperature for up to 4 weeks.    Please try to work on the following dietary changes this first month:    1.  Drink water with meals and throughout the day, cut down on soda and/or juice if consumed. Consider flavored water options like Bubbly, Spindrift, Hint and Yonis. Reduce alcohol servings to 4 per week maximum.  2.  Have protein with each meal, examples include: greek yogurt, cottage cheese, hard boiled egg, tofu, chicken, fish, or tuna.  3.  Work towards reducing/eliminating refined carbohydrates and sugars which includes items such as sweets, as well as rice, pasta, and bread and make sure to choose whole grain options when having them with just 1 serving per meal about the size of your inner palm.  4.  Consume non starchy veggies daily working towards making them a good 50% of your daily food intake. Add them to lunch and dinner consistently.  5.  Start a daily probiotic: VSL#3 is recommended, (order on line at www.vsl3.com). Take 1 capsule daily with water for 30 days, then reduce to 1 every other day (this will reduce the cost). Capsules can be left out of refrigerator for 2 weeks. I recommend using a pill box weekly and keeping the bottle in the fridge.    Please download lorraine My Fitness Pal, LoseIt! Or My Net Diary to monitor daily dietary intake and you will be able to see if you are eating the right amount of calories or too much or too little which would hinder weight loss. Additionally this will help to see your daily carbohydrate and protein intake. When you set the lorraine up choose 1.5 lbs/week as a goal.  Keeping a paper food journal is an option as well to remain accountable for your choices- this is the start to mindful eating! A low  calorie diet has been consistently shown to support weight loss.    Continue or start exercising to help establish a routine. If not already exercising begin with 1 day/week and progress as able with the goal of working towards 30 minutes 5 days a week at a minimum. A variety or cardio, strength and stretching is important. Review resources below to help support you in building this healthy routine.    Meditation daily can help manage and control stress. Chronic stress can make weight loss difficult.  Exercising is one way to help with stress, but meditation using the CALM Ruth or another comparable alternative can be done in your home or place of work with little time commitment. This Ruth can also help work on behavior change and improve sleep. Check out the segment under Calm Masterclass and listen to The 4 Pillars of Health. A great way to begin learning about the foundation of lifestyle with practical tips to use in your every day. In addition, we offer counseling services and support for individual connection and care. A referral is necessary so please let me know if this is a service you are interested.    Check out www.yourLux Bio Grouptters.org blog for continued support and education along your weight loss journey to optimal health!      Patient Resources:    Personal Training/Fitness Classes/Health Coaching    [facility] in [place]: Full fitness center with group fitness and personal training located in [place].  Health Coaching with Wanda Benjamin, Luis Fernando Mora, and Berhane Brooks at our [facility]- individual coaching to work on your health goals. Call [phone] and/or email @ [email or web address]. Free 60 minute consult when client of [facility].  NITISH Howard @ http://www.DeriknormanRoadhopsid.MATRIXX Software. A variety of group fitness options plus various yoga classes [phone] and/or email Tierney at [email or web address]  [facility] Fitness Centers with  multiple locations: Likva (www.Synosia Therapeutics.Purple Harry), F45 Training (www.t86gclnmkwj.Purple Harry), Fit Body Bootcamp (www.SandatabodyboHavgul Clean Energyp.Purple Harry), Project Bionic (www.Revee.Purple Harry), The Exercise  (www.exercisecoach.com), Club Pilates (www.clubpilates.Purple Harry)    Online Fitness  Fitness  on Utube  Fit in 10 DVD series   www.zrume76TSZ.Purple Harry  Chair exercises via Sit and Be Fit (www.sitandbefit.Utility Funding) and EUDOWEB (www.ClearMomentum.com) or John Connelly or Arturo Palmer videos on YouTube.  Hip Hop Fit with Félix Kingsley at www.hiphopfit.AFFiRiS    Apps for on the Go Fitness  Institute 7 Minute Workout (orange box with white 7) - free on the go HIIT training ruth  Peloton Ruth @ www.onepeloton.com    Nutrition Trackers, Meal Preparation, and Other Meal Programs  LoseIT! And My Fitness Pal apps and on line for tracking nutrition  NOOM - virtual health coaching  FitFoundation (healthy meals on the go) in [place] @ www.xcatqrvjpqvhk7jAventones  Bistro MD @ wwwAtBizzbistromd.Purple Harry and Ftqetx03 (calorie smart and low carb plans recommended) @ www.vfegzo08.com, Metabolic Meals @ www.MyMetabolicMeals.com - individual prepared meals to go  Gobble, Blue Apron, Home , Every Plate, Sunbasket- on line meal delivery programs for preparation at home  Meal [facility] in [place] for homemade meals to go @ www.mealvillage.com  Diet Doctor @ www.dietdoctor.com - low carb swaps  ReciMe and Mealime ruth (grocery and meal planning)    Stress, Anxiety, Depression, Trauma  CALM meditation ruth (www.calm.com)  Headspace  Don't let anxiety run your life. Using the science of emotion regulation and mindfulness to overcome fear and worry by Chava Gee PsyD and Jose Batista MA.  The ExactFlat Podcast (September 27, 2023): 6 Magic Words That Stop Anxiety  What Happened to You?- a look at the impact trauma has on behavior written by Ebony Ojeda and Dr. Blaze Lyon  Whole Again by Dennys Briones - discovering your true self  after trauma    Mindful Eating/The Hungry Brain  Am I Hungry? Mindful eating virtual  lorraine (www.amihungry.com)  The Hungry Brain by Liliana Patel, PhD  Mindless Eating by Zackary Marquez  Weight Loss Surgery Will Not Treat Food Addiction by Zaina Fiore Ph.D    Metabolic Dysfunction, Hormones and Cravings  Why We Get Sick? By Clarence Humphrey (insulin resistance)  Your Body in Balance: The New Science of Food, Hormones, and Health by Dr. Von Haas  The Complete Guide to fasting by Dr. Rodríguez  Fast Like a Girl by Dr. Deanna Banks  The M Factor (documentary on PBS about Menopause)  Sugar, Salt & Fat by Chelsea Scott, Ph.D, R.D.  The Truth About Sugar - documentary on sugar (Free on PhotoThera, https://Oculus VR.Merlin Diamonds/4F2kgkmDP1r)  Presentation on SUGAR called Sugar: The Bitter Truth by Dr. Kishore Bowers (PhotoThera) https://Oculus VR.Merlin Diamonds/dBnniua6-oM?si=[email or web address]  Reverse Visceral Fat: #1 Way to Increase Your Lifespan & End Inflammation with Dr. Zeeshan Anna on Utube @ https://Oculus VR.be/nupPRnvUpJY?si=gb8vpfXyZYF1MiaI    Nutrition Support  You Are What You Eat - Netfix series on twin study looking at impact of nutrition changes on health  The End of Dieting: How to Live for Life by Dr. Daron Darden M.D. or listen to The Diabetes America Podcast Episode 63: Understanding \"Nutritarian\" Eating w/Dr. Daron Darden  The Game Changers- Netflix Documentary on plant based nutrition  The Dr. Decker T5 Wellness Plan by Dr. Edwin Decker MD  The Complete Guide to fasting by Dr. Rodríguez  @meRefleXion [facility] ([facility] Dietician with support surrounding nutrition and meal prep/planning)    Education, Motivation and Support Resources  Live to 100: Secrets of the Blue Zones - Netflix series on the secrets to communities living over 100 years old  Atomic Habits by Antonio Skinner (a book about taking small steps to promote greater behavior change)   Motivation lorraine (black box with white \")- daily supportive messages sent to your phone  Can't Hurt Me by Chava  Emre (a book exploring the power of discipline in achieving your goals)  Fed Up - documentary about obesity (Free on Utube)  Www.yourweightmatters.org - Obesity Action Coalition sponsored Blog posts  Obesity Action Coalition Resources on topics specific to weight management (www.obesityaction.org)  Fitlosophy Fitspiration - journal to better health (journal book found at [employer] in fitness aisle)  Hillary Doe talk titled: The Call to Courage (Netflix)  The Exam Room by the Physician's Committee (Podcast)  Nutrition Facts by Dr. Benavides (Podcast)    Balanced Nutrition includes:     Build the mentality of Food 4 Fuel. Clean eating with whole foods and eliminating/reducing ultra processed foods.  Be an intuitive eater and using mindful eating practices.  Eat a balanced plate with protein and produce at all meals: 1/4 plate- protein, 1/2 plate non starchy veggies, and 1/4 plate fruit or complex carbohydrate.  Drink water with all meals and use a salad plate to naturally reduce portions.  Eliminate/reduce late night eating by stopping after 7pm. Allowing your body to fast for 12 hours (drink only water, tea or black coffee without any additives).            Mindful Eating Tips:  When we sit down to a meal by ourselves or with friends, it's easy to zone out and disconnect from our bodies. But, if you approach eating a meal with the intent to stay mindful and present, you will be able to enjoy yourself and walk away from the table feeling good about yourself and your choices. Here are several tips to keep in mind when it comes to food and eating.    Choose for yourself: Do not get hung up on what other people are eating. Instead, ask yourself what you would like to eat.    Forget about good and bad: Remind yourself that foods fall on a nutritional continuum (high value/low value), not on a moral continuum (good/bad).    Stay clear of guilt or shame: Refrain from allowing guilt or shame to contaminate your eating  decisions. Avoid secret eating and get clear on who you are really hiding from if you eat in secret.    Choose foods that you like: Do not eat foods that you do not find satisfying or enjoyable. Eating that way will make you feel like you are on a diet.    Look before you eat: Before you eat, look at your food, its portion size, and presentation. Breathe deeply. Look again before taking a mouthful.    Chew every mouthful: Chewing a lot helps to thoroughly release the flavor of foods. Let food sit on your tongue. This allows your taste buds to absorb the flavor and transmit messages about your appetite to your brain.    Talk or eat: When you are talking, stop eating. When you are eating, stop talking.    Stay connected: Pay attention to your body's appetite signals while you are eating.    Pause while you are eating: Think about how you are feeling about your food in terms of quality and quantity.    Know when to stop eating: Stop eating when flavor intensity declines, as it is bound to do. Do not try to polish off all of the food in front of you. Instead, aim for the moment when flavor peaks and you feel an internal “ah” of satisfaction--then stop.    Evaluate how full you are: Keep asking yourself while you are eating, “Am I still hungry?” and “Am I satisfied?”    by Wanda BenjaminKettering Health [facility]  and       YOGA IS NOT A 4-LETTER WORD    by Adenike English    Obesity Action Coalition Fall 2012  https://www.obesityaction.org/resources/yoga-is-not-a-4-letter-word/    DISCLAIMER: To develop an exercise program that best suits your needs, please consult with your physician. It is important to talk with your doctor before beginning any exercise program.    Losing weight and improving your health can often be a difficult journey. You may feel limited to the types of exercises you can perform. Yoga is an excellent low-impact exercise to help you strengthen your core and increase flexibility. The benefits of yoga  can be yours regardless of your size!    What can yoga do for you?  Yoga is a discipline that’s thousands of years old. The main physical benefits of yoga are well documented:    Reduce Stress  Improve Balance  Increase Flexibility  Develop Core Strength    People come to the yoga mat wanting their physical bodies to change. However, it’s the feeling of well-being that brings people back to their yoga practice.    When asked, “What do you do?” I often see the look of disbelief creep across faces as I reply, “I’m a .” As a woman affected by obesity, I do not fit the image of a , marathoner or triathlete. Yet, that is who I am.    I see this same look of disbelief when I tell a person affected by obesity that they can do yoga right now in the body that they have today. Countless times I’ve been told that someone would do yoga, but only after they’ve lost weight. Unfortunately, this eliminates yoga as a tool for reclaiming their health based on their idea that yoga is only for the already thin and flexible. In fact, yoga can be done by everyone -- lying in bed, sitting in a wheelchair or standing only for brief moments, the benefits of yoga can still be yours.    Yoga as a Valuable Tool for Weight-loss  First time yoga students are often surprised at how much energy and effort it takes to come into and hold even the most simple of yoga poses. However, with yoga, it is not just calorie burning that best supports weight-loss.    Often, options to reclaim your health can be overwhelming. What to do is only the beginning of the process, as the answers to “who, where and when” can cloud and confuse the mind -- leading to no action at all. The easiest way to quiet the mind for clearer thinking is do Deep Belly Breathing (see sidebar) and focus on the words “inhale” and “exhale” to the rhythm of your breath.    Mindfulness is another benefit of yoga that’s often overlooked. Setting your intentions or  goals is an important footprint to your success. Some programs require close attention to guidelines to ensure health. Bringing awareness to your life choices, yoga encourages and reminds you to match your actions to your goals.    How to Start Your Yoga Practice  The best way to start a yoga practice is to sprinkle yoga into your daily life. Little pieces of yoga throughout the day will bring you huge benefits with ease - Deep Belly Breathing at a red light or during commercials while watching TV; standing in Mountain pose (see sidebar) while your coffee brews or the microwave is cooking; or gentle seated twists (see sidebar) in front of the computer while it starts or when you find yourself on hold. Taking any movement you have and holding it a little longer, reaching a little farther, will all be beneficial even if you’re confined to a bed.    When you decide it’s time to find a , location is still the first question to answer. You are more likely to be consistent if the class is convenient to your home or workplace. Call or contact the teacher by email to have a chance to talk about what you want and if they have a class that will fit your needs. Remember, you are the customer. Coming early to class will enable you to find a spot that supports your comfort zone. Some of us like to be in the back or close to a wall. Others want to be up front so we can see everything that’s going on.    If that first location isn’t for you, keep trying. Know that the right teacher and the right class are out there for you. Don’t suffer or spend your money on a class that isn’t working for you, but don’t give up either.    Once you’ve found a class, give yourself permission not to do every pose that’s being taught. Listen for what the foundation movement is, as well as the benefit. From that information, move in a way that makes sense to your body. Go inward and remember -- there’s never any pain in yoga.    Yoga for  Everyone!    3-Part Deep Belly Breath    This can be done anywhere and in any position -- seated, standing or even lying down. Breathing through your nose, soften your diaphragm. Inhale deeply expanding your lungs from bottom, middle to top. On the exhale, release from top, middle to bottom. Go slow. Enjoy the expansion of your lung capacity while you improve your cardiovascular exchange. Great for stress reduction -- I especially like to do this while waiting in the doctor’s office.    Simple Seated Twist    Sit so you’re not touching the back of your chair with your feet comfortably apart planted to the floor. On an exhale, reach across your body with your right hand to left knee or leg. The left hand goes back and can be supported on the seat or the back of the chair. Keep the energy of the hips grounded as you feel the twist deepen from hip to top of the head. Your eyes should look left in the direction of the twist. Breathe in shallow breaths that don’t interfere with the squeeze. Keep your belly relaxed. Hold this twist with the spine tall, even though it’s in rotation. Coming out, inhale back to center and twist to the other side. Twists are great to stimulate and cleanse all the organs and soft tissues of the torso.    Standing Mountain Pose    Stand with your feet at true hip-width apart. We often believe our hips are farther apart than they really are. Reach in from the front and find your hipbones. Place your feet directly below your new found hips -- it’s okay if your thighs squeeze! The important thing is to honor the alignment of your frame, not the flesh. Set your feet so the outside edges are parallel. This will make you feel a little pigeon-toed, causing your knees to be soft. (Never stand with locked knees.) Engage both your abdominal and gluteal core. A gentle press of the shoulder blades will open your heart center and give you a feeling of lightness. You can stand like this anywhere and no one  will even know you’re practicing yoga.    To add arm work, on an inhale, lift the arms out to the sides and up overhead framing the head at the ears, palms facing not touching. Hold and feel the energy from your feet to your fingertips. On an exhale, release the arms down and soften the whole body as you release the pose.    My 3 A’s  Yoga can bring to you what I call the Three A’s: Awareness, Acceptance and Affection. As you build your yoga practice, you’ll find yourself aware of your body in a new way. Your body’s edges will become clearer. Your everyday movements will deepen.    From awareness, you’ll begin to notice how different your body is day-to-day, and so begin to accept those differences -- especially the ones you can never change.    Finally, it is my deepest wish that you will come to love your body just as it is in the moment. Please remember, permanent changes come from love, not from hate -- and you deserve to be loved now and always.

## 2025-05-08 DIAGNOSIS — Z82.49 FAMILY HISTORY OF EARLY CAD: ICD-10-CM

## 2025-05-08 DIAGNOSIS — E78.00 HIGH CHOLESTEROL: ICD-10-CM

## 2025-05-08 LAB
ALBUMIN/GLOBULIN RATIO: 1.9 (CALC) (ref 1–2.5)
ALBUMIN: 4.5 G/DL (ref 3.6–5.1)
ALKALINE PHOSPHATASE: 111 U/L (ref 37–153)
ALT: 28 U/L (ref 6–29)
AST: 20 U/L (ref 10–35)
BILIRUBIN, TOTAL: 0.5 MG/DL (ref 0.2–1.2)
BUN/CREATININE RATIO: 14 (CALC) (ref 6–22)
BUN: 15 MG/DL (ref 7–25)
CALCIUM: 9.6 MG/DL (ref 8.6–10.4)
CARBON DIOXIDE: 27 MMOL/L (ref 20–32)
CHLORIDE: 106 MMOL/L (ref 98–110)
CHOL/HDLC RATIO: 3.2 (CALC)
CHOLESTEROL, TOTAL: 117 MG/DL
CREATININE: 1.04 MG/DL (ref 0.5–1.03)
EGFR: 63 ML/MIN/1.73M2
GLOBULIN: 2.4 G/DL (CALC) (ref 1.9–3.7)
GLUCOSE: 104 MG/DL (ref 65–99)
HDL CHOLESTEROL: 37 MG/DL
LDL-CHOLESTEROL: 61 MG/DL (CALC)
NON-HDL CHOLESTEROL: 80 MG/DL (CALC)
POTASSIUM: 4.6 MMOL/L (ref 3.5–5.3)
PROTEIN, TOTAL: 6.9 G/DL (ref 6.1–8.1)
SODIUM: 141 MMOL/L (ref 135–146)
TRIGLYCERIDES: 105 MG/DL

## 2025-05-08 RX ORDER — ROSUVASTATIN CALCIUM 5 MG/1
5 TABLET, COATED ORAL NIGHTLY
Qty: 90 TABLET | Refills: 3 | Status: SHIPPED | OUTPATIENT
Start: 2025-05-08

## 2025-05-08 RX ORDER — LISINOPRIL 10 MG/1
10 TABLET ORAL DAILY
Qty: 90 TABLET | Refills: 3 | Status: SHIPPED | OUTPATIENT
Start: 2025-05-08

## 2025-05-08 NOTE — TELEPHONE ENCOUNTER
A refill request was received for:  Requested Prescriptions     Pending Prescriptions Disp Refills    LISINOPRIL 10 MG Oral Tab [Pharmacy Med Name: LISINOPRIL 10 MG TABLET] 90 tablet 0     Sig: TAKE 1 TABLET BY MOUTH DAILY    ROSUVASTATIN 5 MG Oral Tab [Pharmacy Med Name: ROSUVASTATIN CALCIUM 5 MG TAB] 90 tablet 0     Sig: TAKE 1 TABLET BY MOUTH EVERY DAY AT NIGHT       Last refill date:12/08/24 lisinopril  02/11/25 rosuvastatin       Last office visit:04/15/25     No future appointments.

## 2025-05-29 ENCOUNTER — PATIENT MESSAGE (OUTPATIENT)
Dept: INTERNAL MEDICINE CLINIC | Facility: CLINIC | Age: 55
End: 2025-05-29

## 2025-05-30 NOTE — TELEPHONE ENCOUNTER
Pa never completed for wegovy 0.5 mg as order did not release to pharmacy.  Released today and pa entered in epic  Attached her only note  Pending approval or denial    Prior Authorization History  semaglutide-weight management (WEGOVY) 0.5 MG/0.5ML Subcutaneous Solution Auto-injector     Approval Details    Authorized from May 30, 2025 to December 26, 2025  Information received electronically from payer

## 2025-08-07 RX ORDER — VENLAFAXINE HYDROCHLORIDE 75 MG/1
75 CAPSULE, EXTENDED RELEASE ORAL DAILY
Qty: 90 CAPSULE | Refills: 3 | Status: SHIPPED | OUTPATIENT
Start: 2025-08-07

## (undated) NOTE — LETTER
[address], :1970    CONSENT FOR PROCEDURE/SEDATION    1. I authorize the performance upon [address]  the following: Mirena Insertion    2.  I authorize Dr. Raghav Stoll MD (and whomever is designated as the doctor’s assistant), to perform _________________________________________ Date:___________     Physician Signature: _______________________________ Date:___________

## (undated) NOTE — LETTER
11/19/18        Kennedy Chauhan  [address]      Dear Dejah Links records indicate that you have outstanding lab work and or testing that was ordered for you and has not yet been completed:  Orders Placed This Encounter      CBC

## (undated) NOTE — MR AVS SNAPSHOT
After Visit Summary   6/14/2024    Neha Wells   MRN: [id number]           Visit Information     Date & Time  6/14/2024 11:30 AM Provider  Wanda Pepper MD [facility], [facility] - OB/GYN Dept. Phone  [phone]      Your Vitals Were  Most recent update: 6/14/2024 11:20 AM    BP   132/80    Pulse   87    Ht   64\"    Wt   186 lb 9.6 oz    BMI   32.03 kg/m²         Allergies as of 6/14/2024  Review status set to Review Complete on 6/14/2024   No Known Allergies     Your Current Medications        Dosage    lisinopril 10 MG Oral Tab Take 1 tablet (10 mg total) by mouth daily.    rosuvastatin 5 MG Oral Tab Take 1 tablet (5 mg total) by mouth nightly.    venlafaxine ER 75 MG Oral Capsule SR 24 Hr Take 1 capsule (75 mg total) by mouth daily.    Levonorgestrel (MIRENA, 52 MG,) 20 MCG/24HR Intrauterine IUD 20 mcg (1 each total) by Intrauterine route once.    Multiple Vitamins-Minerals (MULTI-VITAMIN/MINERALS) Oral Tab Take 1 tablet by mouth daily.    Meloxicam 7.5 MG Oral Tab Take 1 tablet (7.5 mg total) by mouth daily.      Diagnoses for This Visit    Encounter for screening mammogram for breast cancer   [8755933]  -  Primary  Papanicolaou smear for cervical cancer screening   [[id number]]             We Ordered the Following     Normal Orders This Visit    Image-Guided Pap Smear ([facility]) [YMH0846 CPT(R)]     ThinPrep PAP with HPV Reflex Request B [[id number] CUSTOM]     Future Labs/Procedures Expected by Expires    [facility] HEMANT 2D+3D SCREENING BILAT (CPT=77067/73394) [COMBO CPT(R)]  6/14/2024 (Approximate) 6/14/2025    ThinPrep PAP with HPV Reflex Request B [[id number] CUSTOM]  6/14/2024 6/14/2025      Imaging Scheduling Instructions     Around June 14, 2024   Imaging:   [facility] HEMANT 2D+3D SCREENING BILAT (CPT=77067/11046)    Instructions: Your order will generate a \"Scheduling Ticket\" that will be available in [employer] to schedule on your own at a time most convenient to you.       If you do not have a [employer] Account, or if you prefer to speak with someone to schedule your appointment, please call [facility] Scheduling at [phone].                  Did you know that [facility] primary care physicians now offer Video Visits through [employer] for adult patients for a variety of conditions such as allergies, back pain and cold symptoms? Skip the drive and waiting room and online chat with a doctor face-to-face using your web-cam enabled computer or mobile device wherever you are. Video Visits cost $50 and can be paid hassle-free using a credit, debit, or health savings card.  Not active on [employer]? Ask us how to get signed up today!          If you receive a survey from [employer], please take a few minutes to complete it and provide feedback. We strive to deliver the best patient experience and are looking for ways to make improvements. Your feedback will help us do so. For more information on [employer], please visit [email or web address]           No text in SmartText           No text in SmartText

## (undated) NOTE — LETTER
10/23/19        Kennedy Chauhan  [address]  [place] [address]      Dear Lucy Dia records indicate that you have outstanding lab work and or testing that was ordered for you and has not yet been completed:  Orders Placed This Encounter      JUSTINA

## (undated) NOTE — Clinical Note
Thank you for referring Neha to the [facility]. Consult was completed today via clinic.  I have ordered labs, referred for a nutrition consultation with our dietician.  I counseled on the importance of lifestyle intervention in adjunct with medication and started Wegovy for treatment with follow-up advised in about 4 months.

## (undated) NOTE — MR AVS SNAPSHOT
After Visit Summary   4/13/2021    Jaylon Robison    MRN: [id number]           Visit Information     Date & Time  4/13/2021  4:00 PM Provider  Marilin Barnett MD [address]  Phone  [phone] help us do so. For more information on [employer], please visit [email or web address]                   DO YOU KNOW WHERE TO GO? [facility] are never convenient.  If you are dealing with a   non-emergency, consider your option GEENA,   visit [facility] or call [phone]. ([phone])